# Patient Record
Sex: FEMALE | Race: WHITE | HISPANIC OR LATINO | URBAN - METROPOLITAN AREA
[De-identification: names, ages, dates, MRNs, and addresses within clinical notes are randomized per-mention and may not be internally consistent; named-entity substitution may affect disease eponyms.]

---

## 2019-06-20 ENCOUNTER — INPATIENT (INPATIENT)
Facility: HOSPITAL | Age: 49
LOS: 6 days | Discharge: HOME | DRG: 919 | End: 2019-06-27
Attending: INTERNAL MEDICINE | Admitting: INTERNAL MEDICINE
Payer: MEDICAID

## 2019-06-20 VITALS
TEMPERATURE: 98 F | SYSTOLIC BLOOD PRESSURE: 124 MMHG | HEART RATE: 85 BPM | OXYGEN SATURATION: 100 % | WEIGHT: 149.91 LBS | RESPIRATION RATE: 16 BRPM | DIASTOLIC BLOOD PRESSURE: 73 MMHG

## 2019-06-20 LAB
ALBUMIN SERPL ELPH-MCNC: 4.3 G/DL — SIGNIFICANT CHANGE UP (ref 3.5–5)
ALP SERPL-CCNC: 212 U/L — HIGH (ref 40–120)
ALT FLD-CCNC: 735 U/L DA — HIGH (ref 10–60)
ANION GAP SERPL CALC-SCNC: 5 MMOL/L — SIGNIFICANT CHANGE UP (ref 5–17)
APPEARANCE UR: CLEAR — SIGNIFICANT CHANGE UP
AST SERPL-CCNC: 888 U/L — HIGH (ref 10–40)
BASOPHILS # BLD AUTO: 0.04 K/UL — SIGNIFICANT CHANGE UP (ref 0–0.2)
BASOPHILS NFR BLD AUTO: 0.3 % — SIGNIFICANT CHANGE UP (ref 0–2)
BILIRUB SERPL-MCNC: 1.8 MG/DL — HIGH (ref 0.2–1.2)
BILIRUB UR-MCNC: ABNORMAL
BUN SERPL-MCNC: 13 MG/DL — SIGNIFICANT CHANGE UP (ref 7–18)
CALCIUM SERPL-MCNC: 11.6 MG/DL — HIGH (ref 8.4–10.5)
CHLORIDE SERPL-SCNC: 107 MMOL/L — SIGNIFICANT CHANGE UP (ref 96–108)
CO2 SERPL-SCNC: 29 MMOL/L — SIGNIFICANT CHANGE UP (ref 22–31)
COLOR SPEC: YELLOW — SIGNIFICANT CHANGE UP
CREAT SERPL-MCNC: 1 MG/DL — SIGNIFICANT CHANGE UP (ref 0.5–1.3)
DIFF PNL FLD: NEGATIVE — SIGNIFICANT CHANGE UP
EOSINOPHIL # BLD AUTO: 0.01 K/UL — SIGNIFICANT CHANGE UP (ref 0–0.5)
EOSINOPHIL NFR BLD AUTO: 0.1 % — SIGNIFICANT CHANGE UP (ref 0–6)
GLUCOSE SERPL-MCNC: 145 MG/DL — HIGH (ref 70–99)
GLUCOSE UR QL: NEGATIVE — SIGNIFICANT CHANGE UP
HCG SERPL-ACNC: 2 MIU/ML — SIGNIFICANT CHANGE UP
HCG UR QL: NEGATIVE — SIGNIFICANT CHANGE UP
HCT VFR BLD CALC: 47.1 % — HIGH (ref 34.5–45)
HGB BLD-MCNC: 15.3 G/DL — SIGNIFICANT CHANGE UP (ref 11.5–15.5)
IMM GRANULOCYTES NFR BLD AUTO: 0.4 % — SIGNIFICANT CHANGE UP (ref 0–1.5)
KETONES UR-MCNC: NEGATIVE — SIGNIFICANT CHANGE UP
LEUKOCYTE ESTERASE UR-ACNC: NEGATIVE — SIGNIFICANT CHANGE UP
LIDOCAIN IGE QN: 300 U/L — SIGNIFICANT CHANGE UP (ref 73–393)
LYMPHOCYTES # BLD AUTO: 0.7 K/UL — LOW (ref 1–3.3)
LYMPHOCYTES # BLD AUTO: 5 % — LOW (ref 13–44)
MCHC RBC-ENTMCNC: 28.8 PG — SIGNIFICANT CHANGE UP (ref 27–34)
MCHC RBC-ENTMCNC: 32.5 GM/DL — SIGNIFICANT CHANGE UP (ref 32–36)
MCV RBC AUTO: 88.5 FL — SIGNIFICANT CHANGE UP (ref 80–100)
MONOCYTES # BLD AUTO: 0.84 K/UL — SIGNIFICANT CHANGE UP (ref 0–0.9)
MONOCYTES NFR BLD AUTO: 6 % — SIGNIFICANT CHANGE UP (ref 2–14)
NEUTROPHILS # BLD AUTO: 12.42 K/UL — HIGH (ref 1.8–7.4)
NEUTROPHILS NFR BLD AUTO: 88.2 % — HIGH (ref 43–77)
NITRITE UR-MCNC: NEGATIVE — SIGNIFICANT CHANGE UP
NRBC # BLD: 0 /100 WBCS — SIGNIFICANT CHANGE UP (ref 0–0)
PH UR: 7 — SIGNIFICANT CHANGE UP (ref 5–8)
PLATELET # BLD AUTO: 240 K/UL — SIGNIFICANT CHANGE UP (ref 150–400)
POTASSIUM SERPL-MCNC: 4 MMOL/L — SIGNIFICANT CHANGE UP (ref 3.5–5.3)
POTASSIUM SERPL-SCNC: 4 MMOL/L — SIGNIFICANT CHANGE UP (ref 3.5–5.3)
PROT SERPL-MCNC: 8.4 G/DL — HIGH (ref 6–8.3)
PROT UR-MCNC: NEGATIVE — SIGNIFICANT CHANGE UP
RBC # BLD: 5.32 M/UL — HIGH (ref 3.8–5.2)
RBC # FLD: 11.9 % — SIGNIFICANT CHANGE UP (ref 10.3–14.5)
SODIUM SERPL-SCNC: 141 MMOL/L — SIGNIFICANT CHANGE UP (ref 135–145)
SP GR SPEC: 1.01 — SIGNIFICANT CHANGE UP (ref 1.01–1.02)
UROBILINOGEN FLD QL: 8
WBC # BLD: 14.07 K/UL — HIGH (ref 3.8–10.5)
WBC # FLD AUTO: 14.07 K/UL — HIGH (ref 3.8–10.5)

## 2019-06-20 PROCEDURE — 76705 ECHO EXAM OF ABDOMEN: CPT | Mod: 26

## 2019-06-20 PROCEDURE — 74177 CT ABD & PELVIS W/CONTRAST: CPT | Mod: 26

## 2019-06-20 RX ORDER — SODIUM CHLORIDE 9 MG/ML
1000 INJECTION INTRAMUSCULAR; INTRAVENOUS; SUBCUTANEOUS ONCE
Refills: 0 | Status: COMPLETED | OUTPATIENT
Start: 2019-06-20 | End: 2019-06-20

## 2019-06-20 RX ORDER — KETOROLAC TROMETHAMINE 30 MG/ML
15 SYRINGE (ML) INJECTION ONCE
Refills: 0 | Status: DISCONTINUED | OUTPATIENT
Start: 2019-06-20 | End: 2019-06-20

## 2019-06-20 RX ORDER — MORPHINE SULFATE 50 MG/1
4 CAPSULE, EXTENDED RELEASE ORAL ONCE
Refills: 0 | Status: DISCONTINUED | OUTPATIENT
Start: 2019-06-20 | End: 2019-06-20

## 2019-06-20 RX ORDER — ONDANSETRON 8 MG/1
4 TABLET, FILM COATED ORAL ONCE
Refills: 0 | Status: COMPLETED | OUTPATIENT
Start: 2019-06-20 | End: 2019-06-20

## 2019-06-20 RX ADMIN — ONDANSETRON 4 MILLIGRAM(S): 8 TABLET, FILM COATED ORAL at 21:41

## 2019-06-20 RX ADMIN — MORPHINE SULFATE 4 MILLIGRAM(S): 50 CAPSULE, EXTENDED RELEASE ORAL at 21:41

## 2019-06-20 RX ADMIN — SODIUM CHLORIDE 1000 MILLILITER(S): 9 INJECTION INTRAMUSCULAR; INTRAVENOUS; SUBCUTANEOUS at 20:31

## 2019-06-20 NOTE — ED PROVIDER NOTE - PROGRESS NOTE DETAILS
Pt with US abd and CT abd/pelvis showing no acute surgical pathology.  However with elevations in LFT's, alk phos, total bilirubin, will admit.  Dr. Macias accepts unattached admission. BURT hinds.

## 2019-06-20 NOTE — ED ADULT NURSE NOTE - NSIMPLEMENTINTERV_GEN_ALL_ED
Implemented All Universal Safety Interventions:  [place] to call system. Call bell, personal items and telephone within reach. Instruct patient to call for assistance. Room bathroom lighting operational. Non-slip footwear when patient is off stretcher. Physically safe environment: no spills, clutter or unnecessary equipment. Stretcher in lowest position, wheels locked, appropriate side rails in place.

## 2019-06-20 NOTE — ED PROVIDER NOTE - CLINICAL SUMMARY MEDICAL DECISION MAKING FREE TEXT BOX
48 y/o F with history of cholecystectomy presents with abdominal pain and vomiting. Will obtain labs, UA, CT abd/pelvis, ultrasound abdomen and reassess.

## 2019-06-20 NOTE — ED PROVIDER NOTE - CARE PLAN
Principal Discharge DX:	Epigastric pain  Secondary Diagnosis:	Elevated LFTs  Secondary Diagnosis:	Hyperbilirubinemia

## 2019-06-20 NOTE — ED PROVIDER NOTE - ABDOMINAL TENDER
severe upper abdominal tenderness, mild lower abdominal tenderness bilaterally/left lower quadrant/left upper quadrant/right upper quadrant/right lower quadrant

## 2019-06-20 NOTE — ED PROVIDER NOTE - OBJECTIVE STATEMENT
50 y/o F with a significant PMHx of pancreatitis, hepatitis since August 2018 and a significant PSHx of cholecystectomy presents to the ED with complaints of epigastric pain with radiation to mid back  and vomiting x 1 day. Patient reports last bowel movement was approximately 4 days ago. Patient does report scant amount of blood in vomitus. Denies fever, chills, dysuria, hematuria or any other acute complaints.

## 2019-06-21 DIAGNOSIS — R74.0 NONSPECIFIC ELEVATION OF LEVELS OF TRANSAMINASE AND LACTIC ACID DEHYDROGENASE [LDH]: ICD-10-CM

## 2019-06-21 DIAGNOSIS — E83.52 HYPERCALCEMIA: ICD-10-CM

## 2019-06-21 DIAGNOSIS — Z98.890 OTHER SPECIFIED POSTPROCEDURAL STATES: Chronic | ICD-10-CM

## 2019-06-21 DIAGNOSIS — Z98.82 BREAST IMPLANT STATUS: Chronic | ICD-10-CM

## 2019-06-21 DIAGNOSIS — Z29.9 ENCOUNTER FOR PROPHYLACTIC MEASURES, UNSPECIFIED: ICD-10-CM

## 2019-06-21 DIAGNOSIS — R10.9 UNSPECIFIED ABDOMINAL PAIN: ICD-10-CM

## 2019-06-21 DIAGNOSIS — Z90.49 ACQUIRED ABSENCE OF OTHER SPECIFIED PARTS OF DIGESTIVE TRACT: Chronic | ICD-10-CM

## 2019-06-21 DIAGNOSIS — R10.13 EPIGASTRIC PAIN: ICD-10-CM

## 2019-06-21 LAB
24R-OH-CALCIDIOL SERPL-MCNC: 15.4 NG/ML — LOW (ref 30–80)
ALBUMIN SERPL ELPH-MCNC: 3.6 G/DL — SIGNIFICANT CHANGE UP (ref 3.5–5)
ALP SERPL-CCNC: 184 U/L — HIGH (ref 40–120)
ALT FLD-CCNC: 679 U/L DA — HIGH (ref 10–60)
ANION GAP SERPL CALC-SCNC: 7 MMOL/L — SIGNIFICANT CHANGE UP (ref 5–17)
APAP SERPL-MCNC: <10 UG/ML — SIGNIFICANT CHANGE UP (ref 10–30)
AST SERPL-CCNC: 452 U/L — HIGH (ref 10–40)
BASOPHILS # BLD AUTO: 0.03 K/UL — SIGNIFICANT CHANGE UP (ref 0–0.2)
BASOPHILS NFR BLD AUTO: 0.2 % — SIGNIFICANT CHANGE UP (ref 0–2)
BILIRUB DIRECT SERPL-MCNC: 1.9 MG/DL — HIGH (ref 0–0.2)
BILIRUB SERPL-MCNC: 3.1 MG/DL — HIGH (ref 0.2–1.2)
BUN SERPL-MCNC: 8 MG/DL — SIGNIFICANT CHANGE UP (ref 7–18)
CA-I BLD-SCNC: 1.51 MMOL/L — HIGH (ref 1.12–1.3)
CALCIUM SERPL-MCNC: 10.3 MG/DL — SIGNIFICANT CHANGE UP (ref 8.4–10.5)
CALCIUM SERPL-MCNC: 10.8 MG/DL — HIGH (ref 8.4–10.5)
CHLORIDE SERPL-SCNC: 110 MMOL/L — HIGH (ref 96–108)
CHOLEST SERPL-MCNC: 217 MG/DL — HIGH (ref 10–199)
CK MB BLD-MCNC: <1.8 % — SIGNIFICANT CHANGE UP (ref 0–3.5)
CK MB CFR SERPL CALC: <1 NG/ML — SIGNIFICANT CHANGE UP (ref 0–3.6)
CK SERPL-CCNC: 57 U/L — SIGNIFICANT CHANGE UP (ref 21–215)
CO2 SERPL-SCNC: 23 MMOL/L — SIGNIFICANT CHANGE UP (ref 22–31)
CREAT SERPL-MCNC: 0.75 MG/DL — SIGNIFICANT CHANGE UP (ref 0.5–1.3)
EOSINOPHIL # BLD AUTO: 0.01 K/UL — SIGNIFICANT CHANGE UP (ref 0–0.5)
EOSINOPHIL NFR BLD AUTO: 0.1 % — SIGNIFICANT CHANGE UP (ref 0–6)
FOLATE SERPL-MCNC: 8.7 NG/ML — SIGNIFICANT CHANGE UP
GGT SERPL-CCNC: 421 U/L — HIGH (ref 8–40)
GLUCOSE SERPL-MCNC: 105 MG/DL — HIGH (ref 70–99)
HAV IGM SER-ACNC: SIGNIFICANT CHANGE UP
HBA1C BLD-MCNC: 5.4 % — SIGNIFICANT CHANGE UP (ref 4–5.6)
HBV CORE IGM SER-ACNC: SIGNIFICANT CHANGE UP
HBV SURFACE AG SER-ACNC: SIGNIFICANT CHANGE UP
HCT VFR BLD CALC: 43.7 % — SIGNIFICANT CHANGE UP (ref 34.5–45)
HCV AB S/CO SERPL IA: 0.1 S/CO — SIGNIFICANT CHANGE UP (ref 0–0.99)
HCV AB SERPL-IMP: SIGNIFICANT CHANGE UP
HDLC SERPL-MCNC: 52 MG/DL — SIGNIFICANT CHANGE UP
HGB BLD-MCNC: 14.1 G/DL — SIGNIFICANT CHANGE UP (ref 11.5–15.5)
IMM GRANULOCYTES NFR BLD AUTO: 0.6 % — SIGNIFICANT CHANGE UP (ref 0–1.5)
LACTATE SERPL-SCNC: 1 MMOL/L — SIGNIFICANT CHANGE UP (ref 0.7–2)
LIPID PNL WITH DIRECT LDL SERPL: 152 MG/DL — SIGNIFICANT CHANGE UP
LYMPHOCYTES # BLD AUTO: 0.81 K/UL — LOW (ref 1–3.3)
LYMPHOCYTES # BLD AUTO: 5.8 % — LOW (ref 13–44)
MAGNESIUM SERPL-MCNC: 1.9 MG/DL — SIGNIFICANT CHANGE UP (ref 1.6–2.6)
MCHC RBC-ENTMCNC: 28.9 PG — SIGNIFICANT CHANGE UP (ref 27–34)
MCHC RBC-ENTMCNC: 32.3 GM/DL — SIGNIFICANT CHANGE UP (ref 32–36)
MCV RBC AUTO: 89.5 FL — SIGNIFICANT CHANGE UP (ref 80–100)
MONOCYTES # BLD AUTO: 0.87 K/UL — SIGNIFICANT CHANGE UP (ref 0–0.9)
MONOCYTES NFR BLD AUTO: 6.2 % — SIGNIFICANT CHANGE UP (ref 2–14)
NEUTROPHILS # BLD AUTO: 12.15 K/UL — HIGH (ref 1.8–7.4)
NEUTROPHILS NFR BLD AUTO: 87.1 % — HIGH (ref 43–77)
NRBC # BLD: 0 /100 WBCS — SIGNIFICANT CHANGE UP (ref 0–0)
PHOSPHATE SERPL-MCNC: 1.8 MG/DL — LOW (ref 2.5–4.5)
PLATELET # BLD AUTO: 167 K/UL — SIGNIFICANT CHANGE UP (ref 150–400)
POTASSIUM SERPL-MCNC: 3.7 MMOL/L — SIGNIFICANT CHANGE UP (ref 3.5–5.3)
POTASSIUM SERPL-SCNC: 3.7 MMOL/L — SIGNIFICANT CHANGE UP (ref 3.5–5.3)
PROT SERPL-MCNC: 7.1 G/DL — SIGNIFICANT CHANGE UP (ref 6–8.3)
PTH-INTACT FLD-MCNC: 241 PG/ML — HIGH (ref 15–65)
RBC # BLD: 4.88 M/UL — SIGNIFICANT CHANGE UP (ref 3.8–5.2)
RBC # FLD: 11.9 % — SIGNIFICANT CHANGE UP (ref 10.3–14.5)
SODIUM SERPL-SCNC: 140 MMOL/L — SIGNIFICANT CHANGE UP (ref 135–145)
TOTAL CHOLESTEROL/HDL RATIO MEASUREMENT: 4.2 RATIO — SIGNIFICANT CHANGE UP (ref 3.3–7.1)
TRIGL SERPL-MCNC: 66 MG/DL — SIGNIFICANT CHANGE UP (ref 10–149)
TROPONIN I SERPL-MCNC: <0.015 NG/ML — SIGNIFICANT CHANGE UP (ref 0–0.04)
TSH SERPL-MCNC: 0.3 UU/ML — LOW (ref 0.34–4.82)
VIT B12 SERPL-MCNC: 1004 PG/ML — SIGNIFICANT CHANGE UP (ref 232–1245)
VIT D25+D1,25 OH+D1,25 PNL SERPL-MCNC: 96.5 PG/ML — HIGH (ref 19.9–79.3)
WBC # BLD: 13.96 K/UL — HIGH (ref 3.8–10.5)
WBC # FLD AUTO: 13.96 K/UL — HIGH (ref 3.8–10.5)

## 2019-06-21 PROCEDURE — 99285 EMERGENCY DEPT VISIT HI MDM: CPT

## 2019-06-21 PROCEDURE — 99223 1ST HOSP IP/OBS HIGH 75: CPT

## 2019-06-21 RX ORDER — KETOROLAC TROMETHAMINE 30 MG/ML
15 SYRINGE (ML) INJECTION EVERY 8 HOURS
Refills: 0 | Status: DISCONTINUED | OUTPATIENT
Start: 2019-06-21 | End: 2019-06-21

## 2019-06-21 RX ORDER — SODIUM CHLORIDE 9 MG/ML
1000 INJECTION INTRAMUSCULAR; INTRAVENOUS; SUBCUTANEOUS ONCE
Refills: 0 | Status: COMPLETED | OUTPATIENT
Start: 2019-06-21 | End: 2019-06-21

## 2019-06-21 RX ORDER — SODIUM CHLORIDE 9 MG/ML
1000 INJECTION INTRAMUSCULAR; INTRAVENOUS; SUBCUTANEOUS
Refills: 0 | Status: DISCONTINUED | OUTPATIENT
Start: 2019-06-21 | End: 2019-06-26

## 2019-06-21 RX ORDER — POTASSIUM PHOSPHATE, MONOBASIC POTASSIUM PHOSPHATE, DIBASIC 236; 224 MG/ML; MG/ML
15 INJECTION, SOLUTION INTRAVENOUS ONCE
Refills: 0 | Status: COMPLETED | OUTPATIENT
Start: 2019-06-21 | End: 2019-06-21

## 2019-06-21 RX ORDER — ONDANSETRON 8 MG/1
4 TABLET, FILM COATED ORAL EVERY 8 HOURS
Refills: 0 | Status: DISCONTINUED | OUTPATIENT
Start: 2019-06-21 | End: 2019-06-27

## 2019-06-21 RX ORDER — PIPERACILLIN AND TAZOBACTAM 4; .5 G/20ML; G/20ML
3.38 INJECTION, POWDER, LYOPHILIZED, FOR SOLUTION INTRAVENOUS EVERY 12 HOURS
Refills: 0 | Status: DISCONTINUED | OUTPATIENT
Start: 2019-06-21 | End: 2019-06-27

## 2019-06-21 RX ORDER — TRAMADOL HYDROCHLORIDE 50 MG/1
25 TABLET ORAL EVERY 8 HOURS
Refills: 0 | Status: DISCONTINUED | OUTPATIENT
Start: 2019-06-21 | End: 2019-06-25

## 2019-06-21 RX ORDER — PIPERACILLIN AND TAZOBACTAM 4; .5 G/20ML; G/20ML
3.38 INJECTION, POWDER, LYOPHILIZED, FOR SOLUTION INTRAVENOUS ONCE
Refills: 0 | Status: COMPLETED | OUTPATIENT
Start: 2019-06-21 | End: 2019-06-21

## 2019-06-21 RX ADMIN — PIPERACILLIN AND TAZOBACTAM 25 GRAM(S): 4; .5 INJECTION, POWDER, LYOPHILIZED, FOR SOLUTION INTRAVENOUS at 17:12

## 2019-06-21 RX ADMIN — MORPHINE SULFATE 4 MILLIGRAM(S): 50 CAPSULE, EXTENDED RELEASE ORAL at 00:46

## 2019-06-21 RX ADMIN — SODIUM CHLORIDE 2000 MILLILITER(S): 9 INJECTION INTRAMUSCULAR; INTRAVENOUS; SUBCUTANEOUS at 03:35

## 2019-06-21 RX ADMIN — POTASSIUM PHOSPHATE, MONOBASIC POTASSIUM PHOSPHATE, DIBASIC 62.5 MILLIMOLE(S): 236; 224 INJECTION, SOLUTION INTRAVENOUS at 11:59

## 2019-06-21 RX ADMIN — SODIUM CHLORIDE 1000 MILLILITER(S): 9 INJECTION INTRAMUSCULAR; INTRAVENOUS; SUBCUTANEOUS at 00:50

## 2019-06-21 RX ADMIN — PIPERACILLIN AND TAZOBACTAM 200 GRAM(S): 4; .5 INJECTION, POWDER, LYOPHILIZED, FOR SOLUTION INTRAVENOUS at 11:59

## 2019-06-21 RX ADMIN — Medication 30 MILLILITER(S): at 00:49

## 2019-06-21 RX ADMIN — Medication 15 MILLIGRAM(S): at 12:48

## 2019-06-21 RX ADMIN — SODIUM CHLORIDE 1000 MILLILITER(S): 9 INJECTION INTRAMUSCULAR; INTRAVENOUS; SUBCUTANEOUS at 00:54

## 2019-06-21 RX ADMIN — Medication 15 MILLIGRAM(S): at 00:53

## 2019-06-21 RX ADMIN — SODIUM CHLORIDE 1000 MILLILITER(S): 9 INJECTION INTRAMUSCULAR; INTRAVENOUS; SUBCUTANEOUS at 00:53

## 2019-06-21 RX ADMIN — Medication 15 MILLIGRAM(S): at 00:49

## 2019-06-21 RX ADMIN — Medication 15 MILLIGRAM(S): at 13:10

## 2019-06-21 RX ADMIN — SODIUM CHLORIDE 100 MILLILITER(S): 9 INJECTION INTRAMUSCULAR; INTRAVENOUS; SUBCUTANEOUS at 02:36

## 2019-06-21 NOTE — H&P ADULT - NSICDXPASTMEDICALHX_GEN_ALL_CORE_FT
PAST MEDICAL HISTORY:  H/O cholecystitis     H/O hypercalcemia     Hepatitis     History of hyperparathyroidism     Pancreatitis

## 2019-06-21 NOTE — PROGRESS NOTE ADULT - SUBJECTIVE AND OBJECTIVE BOX
NP Note    49 yr old female with PMH of Pancreatitis, Hyperparathyroidism Gallstones s/p Cholecystectomy with stent placement ( 2018), Internal hemorrhoids, Breast implants, Abdominoplasty, chronic constipation, overactive bladder ( s/p bladder surgery) came with complain of abdominal pain  radiating to back and right shoulder. Pt found to have elevated LFTs and bilirubin. CT abdomen and pelvis shows  hepatic steatosis, Biliary stent in place with no acute findings   Pt was admitted with abdominal pain likely due to  retained biliary stent. Started on Zosyn. GI consulted Dr Carias. Pt for ERCP with stent removal  on Monday. States feling better today, on clear liquid diet. No vomiting. No fever     INTERVAL HPI/OVERNIGHT EVENTS: no new complaints    MEDICATIONS  (STANDING):  piperacillin/tazobactam IVPB.. 3.375 Gram(s) IV Intermittent every 12 hours  sodium chloride 0.9%. 1000 milliLiter(s) (100 mL/Hr) IV Continuous <Continuous>    MEDICATIONS  (PRN):  ketorolac   Injectable 15 milliGRAM(s) IV Push every 8 hours PRN Severe Pain (7 - 10)  ondansetron Injectable 4 milliGRAM(s) IV Push every 8 hours PRN Nausea and/or Vomiting  traMADol 25 milliGRAM(s) Oral every 8 hours PRN Moderate Pain (4 - 6)      __________________________________________________  REVIEW OF SYSTEMS:    CONSTITUTIONAL: No fever,   RESPIRATORY: No cough; No shortness of breath  CARDIOVASCULAR: No chest pain, no palpitations  GASTROINTESTINAL: epigastric pain and nausea    NEUROLOGICAL: No headache or numbness, no tremors  MUSCULOSKELETAL: No joint pain, no muscle pain  GENITOURINARY: no dysuria, no frequency, no hesitancy  PSYCHIATRY: no depression , no anxiety  ALL OTHER  ROS negative        Vital Signs Last 24 Hrs  T(C): 38.2 (2019 08:16), Max: 38.2 (2019 08:16)  T(F): 100.8 (2019 08:16), Max: 100.8 (2019 08:16)  HR: 106 (2019 05:05) (85 - 119)  BP: 128/74 (2019 05:05) (85/56 - 128/74)  BP(mean): --  RR: 17 (2019 05:05) (16 - 17)  SpO2: 96% (2019 05:05) (96% - 100%)    ________________________________________________  PHYSICAL EXAM:  GENERAL: NAD  CHEST/LUNG: Clear to auscultation bilaterally with good air entry   HEART: S1 S2  regular; no murmurs, gallops or rubs  ABDOMEN: Soft, + mild ttp at epigastic area and RUQ, Nondistended; Bowel sounds present  EXTREMITIES: no cyanosis; no edema; no calf tenderness  SKIN: warm and dry; no rash  NERVOUS SYSTEM:  Awake and alert; Oriented  to place, person and time ; no new deficits    _________________________________________________  LABS:                        14.1   13.96 )-----------( 167      ( 2019 06:10 )             43.7     06-    140  |  110<H>  |  8   ----------------------------<  105<H>  3.7   |  23  |  0.75    Ca    10.3      2019 06:10  Phos  1.8     -  Mg     1.9         TPro  7.1  /  Alb  3.6  /  TBili  3.1<H>  /  DBili  1.9<H>  /  AST  452<H>  /  ALT  679<H>  /  AlkPhos  184<H>        Urinalysis Basic - ( 2019 20:39 )    Color: Yellow / Appearance: Clear / S.010 / pH: x  Gluc: x / Ketone: Negative  / Bili: Small / Urobili: 8   Blood: x / Protein: Negative / Nitrite: Negative   Leuk Esterase: Negative / RBC: x / WBC x   Sq Epi: x / Non Sq Epi: x / Bacteria: x      CAPILLARY BLOOD GLUCOSE            RADIOLOGY & ADDITIONAL TESTS:    Imaging Personally Reviewed:  YES/NO    Consultant(s) Notes Reviewed:   YES/ No    Care Discussed with Consultants :     Plan of care was discussed with patient and /or primary care giver; all questions and concerns were addressed and care was aligned with patient's wishes.

## 2019-06-21 NOTE — H&P ADULT - HISTORY OF PRESENT ILLNESS
49 yr old F from home, ambulates independently, , originally from [place], with PMH of Pancreatitis, Hyperparathyroidism? ( Diagnosed in 2017, was prescribed Alendronate and Furosemide but refused medications and Surgery), Gallstones s/p Cholecystectomy ( August 2018), Internal hemorrhoids, chronic constipation came with complain of abdominal pain x 1 day.     Patient is Belgian speaking used Interpreters ID: [id number], [id number] states she woke up in morning with sudden severe 10/10 constant epigastric abdominal pain radiating to back on Right side, and Right shoulder pain, which progressively got worse alma after eating fish. She reports of having NBNB vomiting x3. She went to her gastroenterologist Dr. Echavarria who referred her in ED.   Patient notes of having similar abdominal pain in Aug 2018 when she had Cholecystectomy in [facility], she was unaware that stent was placed at that time. In Feb 2019, she developed severe pain again so she went to gastroenterologist who told her that she has Biliary stent which needs to removed. She was unsure and wanted to get records from [facility] before stent removal. Gastroenterologist also did Colonoscopy and Endoscopy in Feb 2019, then told her that she has parasite? ( probably H pylori)  in stomach, needs treatment with medications for 4 weeks. She does not remember name of medications, she took only one week but because of indigestion, she stopped taking medications. 49 yr old F from home, ambulates independently, , originally from [place], with PMH of Pancreatitis, Hyperparathyroidism? ( Diagnosed in 2017, was prescribed Alendronate and Furosemide but refused medications and Surgery.  Serum Calcium in 2017: 11.8), Gallstones s/p Cholecystectomy ( August 2018), Internal hemorrhoids, Breast implants, Abdominoplasty, chronic constipation came with complain of abdominal pain x 1 day.     Patient is Icelandic speaking used Interpreters ID: [id number], [id number] states she woke up in morning with sudden severe 10/10 constant epigastric abdominal pain radiating to back on Right side, and Right shoulder pain, which progressively got worse alma after eating fish. She reports of having NBNB vomiting x3, numbness and tingling in her Left arm, chills.  She went to her gastroenterologist Dr. Echavarria who referred her in ED.     Patient notes of having similar abdominal pain in Aug 2018 when she had Cholecystectomy in [facility], she was unaware that stent was placed at that time. In Feb 2019, she developed severe pain again. Her gastroenterologist ( Dr. Echavarria)  told her at that time that her ultrasound abdomen shows she has a Biliary stent which needs to removed. She was unsure and wanted to get records from [facility] before stent removal. Colonoscopy and Endoscopy in Feb 2019 was done. Her gastroenterologist  told her that she has parasite? ( Probably H pylori)  in stomach, needs treatment with medications for 4 weeks. She does not remember name of medications, she took only one week but because of indigestion, she stopped taking medications. She denies fever, headache, diarrhea, dysuria, hx of renal stones or any other complains. Her last travel was in December 2019 to [place].    In ED, patient's vital signs were remarkable for HR:119, Lowest BP 80/90, EKG shows sinus tachycardia. Labs were remarkable for WBC:14.07, Serum Ca: 11.7, Total Protein 8.4, ALK PO: 212, AST:888, ALT: 735, Bilirubin 1.8, CT abdomen and pelvis shows Ventral hernia, hepatic steatosis, Biliary stent in place. Patient does not take any medications ( although she was prescribed Mirabegron, Lasix and Alendronate)    Goals of care: Full code 49 yr old F from home, ambulates independently, , originally from [place], with PMH of Pancreatitis, Hyperparathyroidism? ( Diagnosed in 2017, was prescribed Alendronate and Furosemide but refused medications and Surgery.  Serum Calcium in 2017: 11.8), Gallstones s/p Cholecystectomy ( August 2018), Internal hemorrhoids, Breast implants, Abdominoplasty, chronic constipation, overactive bladder ( was on Mirabegron, s/p bladder surgery) came with complain of abdominal pain x 1 day.     Patient is Malay speaking used Interpreters ID: [id number], [id number] states she woke up in morning with sudden severe 10/10 constant epigastric abdominal pain radiating to back on Right side, and Right shoulder pain, which progressively got worse alma after eating fish. She reports of having NBNB vomiting x3, numbness and tingling in her Left arm, chills.  She went to her gastroenterologist Dr. Echavarria who referred her in ED.     Patient notes of having similar abdominal pain in Aug 2018 when she had Cholecystectomy in [facility], she was unaware that stent was placed at that time. In Feb 2019, she developed severe pain again. Her gastroenterologist ( Dr. Echavarria)  told her at that time that her ultrasound abdomen shows she has a Biliary stent which needs to removed. She was unsure and wanted to get records from [facility] before stent removal. Colonoscopy and Endoscopy in Feb 2019 was done. Her gastroenterologist  told her that she has parasite? ( Probably H pylori)  in stomach, needs treatment with medications for 4 weeks. She does not remember name of medications, she took only one week but because of indigestion, she stopped taking medications. She denies fever, headache, diarrhea, dysuria, hx of renal stones or any other complains. Her last travel was in December 2019 to [place].    In ED, patient's vital signs were remarkable for HR:119, Lowest BP 80/90, EKG shows sinus tachycardia. Labs were remarkable for WBC:14.07, Serum Ca: 11.7, Total Protein 8.4, ALK PO: 212, AST:888, ALT: 735, Bilirubin 1.8, CT abdomen and pelvis shows Ventral hernia, hepatic steatosis, Biliary stent in place. Patient does not take any medications ( although she was prescribed Mirabegron, Lasix and Alendronate)    Goals of care: Full code    ***Called patient's pharmacy [facility] [phone] for confirmation of medications but no answer

## 2019-06-21 NOTE — H&P ADULT - PROBLEM SELECTOR PLAN 4
RISK                                                          Points  [  ] Previous VTE                                                3  [  ] Thrombophilia                                             2  [  ] Lower limb paralysis                                   2        (unable to hold up >15 seconds)    [  ] Current Cancer                                             2         (within 6 months)  [ x ] Immobilization > 24 hrs                              1  [  ] ICU/CCU stay > 24 hours                             1  [ x ] Age > 60                                                         1    IMPROVE VTE Score: 2  No VTE prophylaxis.

## 2019-06-21 NOTE — CONSULT NOTE ADULT - SUBJECTIVE AND OBJECTIVE BOX
Patient is a 49y old  Female who presents with a chief complaint of Abdominal pain (2019 12:25)      INTERVAL HPI/OVERNIGHT EVENTS:        PAST MEDICAL & SURGICAL HISTORY:  H/O cholecystitis  H/O hypercalcemia  History of hyperparathyroidism  Hepatitis  Pancreatitis  H/O abdominoplasty  H/O breast implant  History of biliary stent insertion  S/P cholecystectomy      REVIEW OF SYSTEMS: Total of twelve systems have been reviewed with patient and found to be negative unless mentioned in HPI      SOCIAL HISTORY  Alcohol: Does not drink  Tobacco: Does not smoke  Illicit substance use: None      FAMILY HISTORY: Non contributory to the present illness        No Known Allergies        T(C): 37.3 (19 @ 13:33), Max: 38.2 (19 @ 08:16)  HR: 89 (19 @ 13:33) (85 - 119)  BP: 104/67 (19 @ 13:33) (85/56 - 128/74)  RR: 16 (19 @ 13:33) (16 - 17)  SpO2: 96% (19 @ 13:33) (96% - 100%)        PHYSICAL EXAM:  GENERAL: Not in distress   CHEST/LUNG:  Aire ntry bilaterally  HEART: s1 and s2 present  ABDOMEN:  Nontender and  Nondistended  EXTREMITIES: No pedal  edema  CNS: Awake and Alert      LABS:                        14.1   13.96 )-----------( 167      ( 2019 06:10 )             43.7         140  |  110<H>  |  8   ----------------------------<  105<H>  3.7   |  23  |  0.75    Ca    10.3      2019 06:10  Phos  1.8       Mg     1.9         TPro  7.1  /  Alb  3.6  /  TBili  3.1<H>  /  DBili  1.9<H>  /  AST  452<H>  /  ALT  679<H>  /  AlkPhos  184<H>        Urinalysis Basic - ( 2019 20:39 )    Color: Yellow / Appearance: Clear / S.010 / pH: x  Gluc: x / Ketone: Negative  / Bili: Small / Urobili: 8   Blood: x / Protein: Negative / Nitrite: Negative   Leuk Esterase: Negative / RBC: x / WBC x   Sq Epi: x / Non Sq Epi: x / Bacteria: x      CAPILLARY BLOOD GLUCOSE            Urinalysis Basic - ( 2019 20:39 )    Color: Yellow / Appearance: Clear / S.010 / pH: x  Gluc: x / Ketone: Negative  / Bili: Small / Urobili: 8   Blood: x / Protein: Negative / Nitrite: Negative   Leuk Esterase: Negative / RBC: x / WBC x   Sq Epi: x / Non Sq Epi: x / Bacteria: x        MEDICATIONS  (STANDING):  piperacillin/tazobactam IVPB.. 3.375 Gram(s) IV Intermittent every 12 hours  sodium chloride 0.9%. 1000 milliLiter(s) (100 mL/Hr) IV Continuous <Continuous>    MEDICATIONS  (PRN):  ketorolac   Injectable 15 milliGRAM(s) IV Push every 8 hours PRN Severe Pain (7 - 10)  ondansetron Injectable 4 milliGRAM(s) IV Push every 8 hours PRN Nausea and/or Vomiting  traMADol 25 milliGRAM(s) Oral every 8 hours PRN Moderate Pain (4 - 6)          RADIOLOGY & ADDITIONAL TESTS: Patient is a 49y old  Female who presents with a chief complaint of Abdominal pain (2019 12:25)      REVIEW OF SYSTEMS: Total of twelve systems have been reviewed with patient and found to be negative unless mentioned in HPI        PAST MEDICAL & SURGICAL HISTORY:  H/O cholecystitis  H/O hypercalcemia  History of hyperparathyroidism  Hepatitis  Pancreatitis  H/O abdominoplasty  H/O breast implant  History of biliary stent insertion  S/P cholecystectomy        SOCIAL HISTORY  Alcohol: Does not drink  Tobacco: Does not smoke  Illicit substance use: None      FAMILY HISTORY: Non contributory to the present illness        ALLERGIES: No Known Allergies        T(C): 37.3 (19 @ 13:33), Max: 38.2 (19 @ 08:16)  HR: 89 (19 @ 13:33) (85 - 119)  BP: 104/67 (19 @ 13:33) (85/56 - 128/74)  RR: 16 (19 @ 13:33) (16 - 17)  SpO2: 96% (19 @ 13:33) (96% - 100%)        PHYSICAL EXAM:  GENERAL: Not in distress   CHEST/LUNG:  Aire ntry bilaterally  HEART: s1 and s2 present  ABDOMEN:  Nontender and  Nondistended  EXTREMITIES: No pedal  edema  CNS: Awake and Alert      LABS:                        14.1   13.96 )-----------( 167      ( 2019 06:10 )             43.7             140  |  110<H>  |  8   ----------------------------<  105<H>  3.7   |  23  |  0.75    Ca    10.3      2019 06:10  Phos  1.8       Mg     1.9         TPro  7.1  /  Alb  3.6  /  TBili  3.1<H>  /  DBili  1.9<H>  /  AST  452<H>  /  ALT  679<H>  /  AlkPhos  184<H>          Urinalysis Basic - ( 2019 20:39 )  Color: Yellow / Appearance: Clear / S.010 / pH: x  Gluc: x / Ketone: Negative  / Bili: Small / Urobili: 8   Blood: x / Protein: Negative / Nitrite: Negative   Leuk Esterase: Negative / RBC: x / WBC x   Sq Epi: x / Non Sq Epi: x / Bacteria: x          MEDICATIONS  (STANDING):  piperacillin/tazobactam IVPB.. 3.375 Gram(s) IV Intermittent every 12 hours  sodium chloride 0.9%. 1000 milliLiter(s) (100 mL/Hr) IV Continuous <Continuous>    MEDICATIONS  (PRN):  ketorolac   Injectable 15 milliGRAM(s) IV Push every 8 hours PRN Severe Pain (7 - 10)  ondansetron Injectable 4 milliGRAM(s) IV Push every 8 hours PRN Nausea and/or Vomiting  traMADol 25 milliGRAM(s) Oral every 8 hours PRN Moderate Pain (4 - 6)        RADIOLOGY & ADDITIONAL TESTS:    < from: CT Abdomen and Pelvis w/ IV Cont (19 @ 23:50) >  IMPRESSION: No acute intra-abdominal abnormality to explain the   presenting symptoms.    Incidental findings including hepatic steatosis, biliary stent in place,   breast implants, bilateral spondylolysis of L5.    < end of copied text > Patient is a 49y old  Female from home,  with h/o  Pancreatitis, Hyperparathyroidism? , Diagnosed in , was prescribed Alendronate and Furosemide but refused medications and Surgery.  Gallstones s/p Cholecystectomy ( 2018), Internal hemorrhoids, Breast implants, Abdominoplasty, chronic constipation, overactive bladder ( was on Mirabegron, s/p bladder surgery) presents to the ER for evaluation of  worsening epigastric pain, vomiting x3, numbness and tingling in her Left arm, chills.  She went to her gastroenterologist Dr. Echavarria who referred her in ER. On admission, she found to have  tachycardia, HR:119, Hypotensive,  BP 80/90, and Leukocytosis, WBC:14.07, Serum Ca: 11.7, and elevated LFts. The  CT abdomen and pelvis shows Ventral hernia, hepatic steatosis, Biliary stent in place.         REVIEW OF SYSTEMS: Total of twelve systems have been reviewed with patient and found to be negative unless mentioned in HPI        PAST MEDICAL & SURGICAL HISTORY:  H/O cholecystitis  H/O hypercalcemia  History of hyperparathyroidism  Hepatitis  Pancreatitis  H/O abdominoplasty  H/O breast implant  History of biliary stent insertion  S/P cholecystectomy        SOCIAL HISTORY  Alcohol: Does not drink  Tobacco: Does not smoke  Illicit substance use: None, She is a  , originally from [place],      FAMILY HISTORY: Non contributory to the present illness        ALLERGIES: No Known Allergies        T(C): 37.3 (19 @ 13:33), Max: 38.2 (19 @ 08:16)  HR: 89 (19 @ 13:33) (85 - 119)  BP: 104/67 (19 @ 13:33) (85/56 - 128/74)  RR: 16 (19 @ 13:33) (16 - 17)  SpO2: 96% (19 @ 13:33) (96% - 100%)        PHYSICAL EXAM:  GENERAL: Not in distress   CHEST/LUNG:  Air  entry bilaterally  HEART: s1 and s2 present  ABDOMEN:  Nontender and  Nondistended  EXTREMITIES: No pedal  edema  CNS: Awake and Alert        LABS:                        14.1   13.96 )-----------( 167      ( 2019 06:10 )             43.7         -    140  |  110<H>  |  8   ----------------------------<  105<H>  3.7   |  23  |  0.75    Ca    10.3      2019 06:10  Phos  1.8       Mg     1.9         TPro  7.1  /  Alb  3.6  /  TBili  3.1<H>  /  DBili  1.9<H>  /  AST  452<H>  /  ALT  679<H>  /  AlkPhos  184<H>          Urinalysis Basic - ( 2019 20:39 )  Color: Yellow / Appearance: Clear / S.010 / pH: x  Gluc: x / Ketone: Negative  / Bili: Small / Urobili: 8   Blood: x / Protein: Negative / Nitrite: Negative   Leuk Esterase: Negative / RBC: x / WBC x   Sq Epi: x / Non Sq Epi: x / Bacteria: x          MEDICATIONS  (STANDING):  piperacillin/tazobactam IVPB.. 3.375 Gram(s) IV Intermittent every 12 hours  sodium chloride 0.9%. 1000 milliLiter(s) (100 mL/Hr) IV Continuous <Continuous>    MEDICATIONS  (PRN):  ketorolac   Injectable 15 milliGRAM(s) IV Push every 8 hours PRN Severe Pain (7 - 10)  ondansetron Injectable 4 milliGRAM(s) IV Push every 8 hours PRN Nausea and/or Vomiting  traMADol 25 milliGRAM(s) Oral every 8 hours PRN Moderate Pain (4 - 6)        RADIOLOGY & ADDITIONAL TESTS:    19 : CT Abdomen and Pelvis w/ IV Cont (19 @ 23:50) : No acute intra-abdominal abnormality to explain the  presenting symptoms. Incidental findings including hepatic steatosis, biliary stent in place, breast implants, bilateral spondylolysis of L5.

## 2019-06-21 NOTE — CONSULT NOTE ADULT - ASSESSMENT
49 year old female with obstructive jaundice, retained biliary stent.     Plan:  Clear liquid diet   Anbiotic coverage   IM clearance for Anesthesia on Monday ( given hypercalcemia)   ERCP with stent removal and clearance of duct Monday in the OR at 1 pm (NPO post midnight Yassine night)

## 2019-06-21 NOTE — H&P ADULT - NSHPPHYSICALEXAM_GEN_ALL_CORE
Vital Signs Last 24 Hrs  T(C): 37.3 (21 Jun 2019 03:00), Max: 37.3 (21 Jun 2019 03:00)  T(F): 99.2 (21 Jun 2019 03:00), Max: 99.2 (21 Jun 2019 03:00)  HR: 105 (21 Jun 2019 03:00) (85 - 119)  BP: 85/56 (21 Jun 2019 03:00) (85/56 - 124/73)  BP(mean): --  RR: 17 (21 Jun 2019 03:00) (16 - 17)  SpO2: 97% (21 Jun 2019 03:00) (97% - 100%,

## 2019-06-21 NOTE — H&P ADULT - ATTENDING COMMENTS
She is a 49 year old Welsh speaking woman with hx of gallstone pancreatitis s/p cholecystectomy in [facility] last year presenting with epigastric pain associated with vomiting, and left arm numbness and pain and SOB. There are intermittent palpitations as well.  There is no fever, but did have chills, no diarrhea or other symptoms.  IN the ED, she is noted to have elevated liver enzymes but no significant findings on imaging.    She feels these symptoms are similar to what she had during her pancreatitis episode a year prior.  She incidentally has had many similar episodes in the past year- for which she had abdominal  imaging studies, upper and lower endoscopy with no abnormal findings.  She has no known medical history and no alcohol or tobacco abuse.     Vital Signs Last 24 Hrs  T(C): 37 (2019 23:55), Max: 37 (2019 23:55)  T(F): 98.6 (2019 23:55), Max: 98.6 (2019 23:55)  HR: 119 (2019 23:55) (85 - 119)  BP: 119/76 (2019 23:55) (119/76 - 124/73)  RR: 17 (2019 23:55) (16 - 17)  SpO2: 98% (2019 23:55) (98% - 100%)    Young woman, NAD AAO X 3  No pallor, no icterus  No oropharyngeal erythema  No JVD, no neck swelling  RRR s1s2 only  CTA B/L  No pedal edema  No focal deficits                          15.3   14.07 )-----------( 240      ( 2019 20:15 )             47.1     06-20    141  |  107  |  13  ----------------------------<  145<H>  4.0   |  29  |  1.00    Ca    11.6<H>      2019 20:15    TPro  8.4<H>  /  Alb  4.3  /  TBili  1.8<H>  /  DBili  x   /  AST  888<H>  /  ALT  735<H>  /  AlkPhos  212<H>  06-20    Urinalysis Basic - ( 2019 20:39 )    Color: Yellow / Appearance: Clear / S.010 / pH: x  Gluc: x / Ketone: Negative  / Bili: Small / Urobili: 8   Blood: x / Protein: Negative / Nitrite: Negative   Leuk Esterase: Negative / RBC: x / WBC x   Sq Epi: x / Non Sq Epi: x / Bacteria: x    US RUQ  The liver demonstrates fatty echotexture without focal lesion.  Hepatic   size and contours are maintained.  Hepatic and portal veins appear patent   and are not displaced.  No intrahepatic or ductal dilatation is found.    The common duct is not dilated, measuring .85cm.  The gallbladder is   surgically absent. The right kidney measures 10.1 cm in length.  It   demonstrates no mass, calculus or hydronephrosis.  The abdominal aorta       CT abdomen: No acute intra-abdominal abnormality to explain the   presenting symptoms.  Incidental findings including hepatic steatosis, biliary stent in place,   breast implants, bilateral spondylolysis of L5.  and IVC appear intact.    Impression  49 year old woman with hx as above presenting with recurrent epigastric pain with associated left arm pain/numbness, SOB and intermittent palpitations in the background of transaminases and mild bilirubinemia. It is unclear if this is related to the stent in the CBD ( despite cholecystectomy) but will consult GI physician.   There is a concern for an atypical ACS here as well and would r/o ACS    A/P  1) Epigastric pain   Transaminases and bilirubinemia  - r/o in-stent blockage of the CBD  - R/O ACS   - ?? acute viral hepatitis    2) Hypercalcemia  ? dehydration/constipation vs. endocrine changes    Plan  Serial trop  EKG  TSH/A1c/ vit D/PTH  IVF hydration +/- lasix  repeat chemistry in AM  GI consult   Call to primary GI physician - DR Echavarria ( [phone]) She is a 49 year old Telugu speaking woman with hx of gallstone pancreatitis s/p cholecystectomy in [facility] last year presenting with epigastric pain associated with vomiting, and left arm numbness and pain and SOB. There are intermittent palpitations as well.  There is no fever, but did have chills, no diarrhea or other symptoms.  IN the ED, she is noted to have elevated liver enzymes but no significant findings on imaging.    She feels these symptoms are similar to what she had during her pancreatitis episode a year prior.  She incidentally has had many similar episodes in the past year- for which she had abdominal  imaging studies, upper and lower endoscopy with no abnormal findings.  She has no known medical history and no alcohol or tobacco abuse.     Vital Signs Last 24 Hrs  T(C): 37 (2019 23:55), Max: 37 (2019 23:55)  T(F): 98.6 (2019 23:55), Max: 98.6 (2019 23:55)  HR: 119 (2019 23:55) (85 - 119)  BP: 119/76 (2019 23:55) (119/76 - 124/73)  RR: 17 (2019 23:55) (16 - 17)  SpO2: 98% (2019 23:55) (98% - 100%)    Young woman, NAD AAO X 3  No pallor, no icterus  No oropharyngeal erythema  No JVD, no neck swelling  RRR s1s2 only  CTA B/L  soft, TTP in the epigastric /umbilical/suprapubic regions  No pedal edema  No focal deficits                          15.3   14.07 )-----------( 240      ( 2019 20:15 )             47.1     06-20    141  |  107  |  13  ----------------------------<  145<H>  4.0   |  29  |  1.00    Ca    11.6<H>      2019 20:15    TPro  8.4<H>  /  Alb  4.3  /  TBili  1.8<H>  /  DBili  x   /  AST  888<H>  /  ALT  735<H>  /  AlkPhos  212<H>  06-20    Urinalysis Basic - ( 2019 20:39 )    Color: Yellow / Appearance: Clear / S.010 / pH: x  Gluc: x / Ketone: Negative  / Bili: Small / Urobili: 8   Blood: x / Protein: Negative / Nitrite: Negative   Leuk Esterase: Negative / RBC: x / WBC x   Sq Epi: x / Non Sq Epi: x / Bacteria: x    US RUQ  The liver demonstrates fatty echotexture without focal lesion.  Hepatic   size and contours are maintained.  Hepatic and portal veins appear patent   and are not displaced.  No intrahepatic or ductal dilatation is found.    The common duct is not dilated, measuring .85cm.  The gallbladder is   surgically absent. The right kidney measures 10.1 cm in length.  It   demonstrates no mass, calculus or hydronephrosis.  The abdominal aorta       CT abdomen: No acute intra-abdominal abnormality to explain the   presenting symptoms.  Incidental findings including hepatic steatosis, biliary stent in place,   breast implants, bilateral spondylolysis of L5.  and IVC appear intact.    Impression  49 year old woman with hx as above presenting with recurrent epigastric pain with associated left arm pain/numbness, SOB and intermittent palpitations in the background of transaminases and mild bilirubinemia. It is unclear if this is related to the stent in the CBD ( despite cholecystectomy) but will consult GI physician.   There is a concern for an atypical ACS here as well and would r/o ACS    A/P  1) Epigastric pain   Transaminases and bilirubinemia  - r/o in-stent blockage of the CBD  - R/O ACS   - ?? acute viral hepatitis    2) Hypercalcemia  Apparent hx of hyperparathyroidism but refused treatment; would obtain baseline work up  Counseling for compliance with treatment   ? dehydration/constipation vs. endocrine changes    Plan  Serial trop  EKG  TSH/A1c/ vit D/PTH  IVF hydration +/- lasix  repeat chemistry in AM  GI consult   Call to primary GI physician - DR Echavarria ( [phone])    NB- Patient now discloses she has PMH of hyperparathyroidism but refused treatment offered in the past.  She was also noted on EGD to have ? H-pylori but failed to complete treatment. She is a 49 year old Macanese speaking woman with hx of gallstone pancreatitis s/p cholecystectomy in [facility] last year presenting with epigastric pain associated with vomiting, and left arm numbness and pain and SOB. There are intermittent palpitations as well. There is no fever, but did have chills, no diarrhea or other symptoms.  IN the ED, she is noted to have elevated liver enzymes but no significant findings on imaging. She feels these symptoms are similar to what she had during her pancreatitis episode a year prior. She incidentally has had many similar episodes in the past year- for which she had abdominal  imaging studies, upper and lower endoscopy with no abnormal findings.  She has no known medical history and no alcohol or tobacco abuse.     Vital Signs Last 24 Hrs  T(C): 37 (2019 23:55), Max: 37 (2019 23:55)  T(F): 98.6 (2019 23:55), Max: 98.6 (2019 23:55)  HR: 119 (2019 23:55) (85 - 119)  BP: 119/76 (2019 23:55) (119/76 - 124/73)  RR: 17 (2019 23:55) (16 - 17)  SpO2: 98% (2019 23:55) (98% - 100%)    Young woman, NAD AAO X 3No pallor, no icterus. No oropharyngeal erythema  No JVD, no neck swelling  RRR s1s2 only CTA B/L  soft, TTP in the epigastric /umbilical/suprapubic regions  No pedal edemaNo focal deficits                        15.3   14.07 )-----------( 240      ( 2019 20:15 )             47.1             06-20  141  |  107  |  13  ----------------------------<  145<H>  4.0   |  29  |  1.00    Ca    11.6<H>      2019 20:15TPro  8.4<H>  /  Alb  4.3  /  TBili  1.8<H>  /  DBili  x   /  AST  888<H>  /  ALT  735<H>  /  AlkPhos  212<H>  06-20    Urinalysis Basic - ( 2019 20:39 )  Color: Yellow / Appearance: Clear / S.010 / pH: xGluc: x / Ketone: Negative  / Bili: Small / Urobili: 8   Blood: x / Protein: Negative / Nitrite: Negative Leuk Esterase: Negative / RBC: x / WBC x Sq Epi: x / Non Sq Epi: x / Bacteria: x    US RUQ  The liver demonstrates fatty echotexture without focal lesion.  Hepatic size and contours are maintained.  Hepatic and portal veins appear patent and are not displaced.  No intrahepatic or ductal dilatation is found.  The common duct is not dilated, measuring .85cm.  The gallbladder is surgically absent. The right kidney measures 10.1 cm in length.  It demonstrates no mass, calculus or hydronephrosis.  The abdominal aorta     CT abdomen: No acute intra-abdominal abnormality to explain the presenting symptoms. Incidental findings including hepatic steatosis, biliary stent in place, breast implants, bilateral spondylolysis of L5.and IVC appear intact.    Impression  49 year old woman with hx as above presenting with recurrent epigastric pain with associated left arm pain/numbness, SOB and intermittent palpitations in the background of transaminases and mild bilirubinemia. It is unclear if this is related to the stent in the CBD ( despite cholecystectomy) but will consult GI physician.   There is a concern for an atypical ACS here as well and would r/o ACS    A/P  1) Epigastric pain   Transaminitis and bilirubinemia  - r/o in-stent blockage of the CBD  - R/O ACS   - ?? acute viral hepatitis    2) Hypercalcemia  Apparent hx of hyperparathyroidism but refused treatment; would obtain baseline work up  Endocrinology consult  Counseling for compliance with treatment   ? dehydration/constipation vs. endocrine changes    Plan  Serial trop  EKG  TSH/A1c/ vit D/ PTH  IVF hydration +/- lasix  repeat chemistry in AM  GI consult   Call to primary GI physician - DR Echavarria ( [phone])    NB- Patient now discloses she has PMH of hyperparathyroidism but refused treatment offered in the past.  She was also noted on EGD to have ? H-pylori but failed to complete treatment.    NB- report of hypotension and low grade fever ( which with rectal temp confirmed at 100.8F) a few hours after admission and uptrending total and direct bilirubin is concerning for an acute cholangitis.  IVF resuscitation and broad spectrum IV antibiotics with zosyn after sepsis work up done  Updated GI consultant about changes.   Pt otherwise responded to IVF challenge and is hemodynamically stable with intact mental status exam  Will continue monitoring closely  JOMAR Edmond in charge of patient care updated and instruction for prompt GI call if patient's status deteriorates given.

## 2019-06-21 NOTE — H&P ADULT - PROBLEM SELECTOR PLAN 1
p/w epigastric abdominal pain, radiation to RUQ could be due to biliary stent/ H pylori induced peptic ulcer?   Will also rule out intrabdominal infection due to Biliary stent as patient was leukocytosis, hypotensive, tachycardiac but does not look toxic  F/u Blood cultures p/w epigastric abdominal pain, radiation to RUQ could be due to biliary stent/ H pylori induced peptic ulcer?   Will also rule out intrabdominal infection due to Biliary stent as patient was leukocytosis, hypotensive, tachycardiac but does not look toxic  F/u Blood cultures  c/w IV hydration, pain management  Avoid Tyelnol p/w epigastric abdominal pain, radiation to RUQ could be due to biliary stent/ H pylori induced peptic ulcer?   Will also rule out intrabdominal infection due to Biliary stent as patient was leukocytosis, hypotensive, tachycardiac but does not look toxic  F/u Blood cultures  c/w IV hydration, pain management  Avoid Tyelnol  ID consult Dr. Edmond  Gastro Dr. Loving

## 2019-06-21 NOTE — CONSULT NOTE ADULT - SUBJECTIVE AND OBJECTIVE BOX
Patient is a 49y old  Female who presents with a chief complaint of Abdominal pain (21 Jun 2019 10:24)    49 year  old Female PMH of Pancreatitis, Gallstones s/p Cholecystectomy presents with sudden severe 10/10 constant epigastric abdominal pain radiating to back.  Patient notes of having similar abdominal pain in Aug 2018 when she had Cholecystectomy in [facility], she was unaware that stent was placed at that time. In Feb 2019, she developed severe pain again. Her gastroenterologist ( Dr. Echavarria)  told her at that time that her ultrasound abdomen shows she has a Biliary stent which needs to removed.     REVIEW OF SYSTEMS  Constitutional:   No fever, no fatigue, no pallor, no night sweats, no weight loss.  HEENT:   No eye pain, no vision changes, no icterus, no mouth ulcers.  Respiratory:   No shortness of breath, no cough, no respiratory distress.   Cardiovascular:   No chest pain, no palpitations.   Gastrointestinal: + abdominal pain, no nausea, no vomiting , no diarrhea no constipation, no hematochezia, no melena.  Skin:   No rashes, no jaundice, no eczema.   Musculoskeletal:   No joint pain, no swelling, no myalgia.   Neurologic:   No headache, no seizure, no weakness.   Genitourinary:   No dysuria, no decreased urine output.  Psychiatric:  No depression, no anxiety,   Endocrine:   No thyroid disease, no diabetes.  Heme/Lymphatic:   No anemia, no blood transfusions, no lymph node enlargement, no bleeding, no bruising.  ___________________________________________________________________________________________  Allergies    No Known Allergies    Intolerances      MEDICATIONS  (STANDING):  piperacillin/tazobactam IVPB. 3.375 Gram(s) IV Intermittent once  piperacillin/tazobactam IVPB.. 3.375 Gram(s) IV Intermittent every 12 hours  potassium phosphate IVPB 15 milliMole(s) IV Intermittent once  sodium chloride 0.9%. 1000 milliLiter(s) (100 mL/Hr) IV Continuous <Continuous>    MEDICATIONS  (PRN):  ketorolac   Injectable 15 milliGRAM(s) IV Push every 8 hours PRN Severe Pain (7 - 10)  ondansetron Injectable 4 milliGRAM(s) IV Push every 8 hours PRN Nausea and/or Vomiting  traMADol 25 milliGRAM(s) Oral every 8 hours PRN Moderate Pain (4 - 6)      PAST MEDICAL & SURGICAL HISTORY:  H/O cholecystitis  H/O hypercalcemia  History of hyperparathyroidism  Hepatitis  Pancreatitis  H/O abdominoplasty  H/O breast implant  History of biliary stent insertion  S/P cholecystectomy    FAMILY HISTORY:  FHx: diabetes mellitus    Social History: No hsitory of : Tobacco use, IVDA, EToH  ______________________________________________________________________________________    PHYSICAL EXAM    Daily     Daily   BMI:   Change in Weight:  Vital Signs Last 24 Hrs  T(C): 38.2 (21 Jun 2019 08:16), Max: 38.2 (21 Jun 2019 08:16)  T(F): 100.8 (21 Jun 2019 08:16), Max: 100.8 (21 Jun 2019 08:16)  HR: 106 (21 Jun 2019 05:05) (85 - 119)  BP: 128/74 (21 Jun 2019 05:05) (85/56 - 128/74)  BP(mean): --  RR: 17 (21 Jun 2019 05:05) (16 - 17)  SpO2: 96% (21 Jun 2019 05:05) (96% - 100%)    General:  Well developed, well nourished, alert and active, no pallor, NAD.  HEENT:    Normal appearance of conjunctiva, ears, nose, lips, oropharynx, and oral mucosa, anicteric.  Neck:  No masses, no asymmetry.  Lymph Nodes:  No lymphadenopathy.   Cardiovascular:  RRR normal S1/S2, no murmur.  Respiratory:  CTA B/L, normal respiratory effort.   Abdominal:  + tenderness epigastrium   Extremities:   No clubbing or cyanosis, normal capillary refill, no edema.   Skin:   No rash, jaundice, lesions, eczema.   Musculoskeletal:  No joint swelling, erythema or tenderness.   Neuro: No focal deficits.   Other:   _______________________________________________________________________________________________  Lab Results:                          14.1   13.96 )-----------( 167      ( 21 Jun 2019 06:10 )             43.7     06-21    140  |  110<H>  |  8   ----------------------------<  105<H>  3.7   |  23  |  0.75    Ca    10.3      21 Jun 2019 06:10  Phos  1.8     06-21  Mg     1.9     06-21    TPro  7.1  /  Alb  3.6  /  TBili  3.1<H>  /  DBili  1.9<H>  /  AST  452<H>  /  ALT  679<H>  /  AlkPhos  184<H>  06-21    LIVER FUNCTIONS - ( 21 Jun 2019 09:52 )  Alb: x     / Pro: x     / ALK PHOS: x     / ALT: x     / AST: x     / GGT: 421 U/L         Gamma Glutamyl Transferase, Serum: 421 U/L (06-21 @ 09:52)  Triglycerides, Serum: 66 mg/dL (06-21 @ 06:10)    CARDIAC MARKERS ( 21 Jun 2019 06:10 )  <0.015 ng/mL / x     / 57 U/L / x     / <1.0 ng/mL      Stool Results:          RADIOLOGY RESULTS:    EXAM:  CT ABDOMEN AND PELVIS IC                            PROCEDURE DATE:  06/20/2019          INTERPRETATION:  CLINICAL INFORMATION: History of cholecystectomy.   Epigastric pain in vomiting, elevated LFT.    COMPARISON: Same day ultrasound    PROCEDURE:   Contiguous axial scan of the abdomen and pelvis with intravenous but   without oral contrast, followed by coronal and sagittal reformation. 90   mL of Omnipaque were administered without adverse reaction. 10 mL of   contrast were discarded.    FINDINGS:    LOWER CHEST: Bilateral breast implants without complicating features.    LIVER: Hepatic steatosis. No focal abnormality.  BILE DUCTS: Normal caliber. Stent in the common duct.  GALLBLADDER: Cholecystectomy clips.  SPLEEN: Within normal limits.  PANCREAS: Within normal limits.  ADRENALS: Within normal limits.  KIDNEYS/URETERS: Within normal limits.    BLADDER: Within normal limits.  REPRODUCTIVE ORGANS: Status post supracervical hysterectomy. Unremarkable   adnexa.    BOWEL: No bowel obstruction. Appendix is normal.  PERITONEUM: No ascites.  VESSELS:  Within normal limits.  RETROPERITONEUM: No lymphadenopathy.    ABDOMINAL WALL: Tiny fat-containing ventral hernia.  BONES: No acute abnormality. Vertebral hemangioma of T12. Bilateral   spondylolysis of L5.    IMPRESSION: No acute intra-abdominal abnormality to explain the   presenting symptoms.    Incidental findings including hepatic steatosis, biliary stent in place,   breast implants, bilateral spondylolysis of L5.                  MALGORZATA CALIX M.D., ATTENDING RADIOLOGIST  This document has been electronically signed. Jun 21 2019 12:38AM                SURGICAL PATHOLOGY:

## 2019-06-21 NOTE — H&P ADULT - PROBLEM SELECTOR PLAN 3
Patient has hx of hypercalcemia, she was told that her parathyroid levels are high and she needs surgery but she refused, she was also prescribed alendronate and furosemide but she does not take it  c/w IV hydration  F/u Vitamin D, PTH, Ionized Ca

## 2019-06-21 NOTE — H&P ADULT - ASSESSMENT
49 yr old F from home, ambulates independently, , originally from [place], with PMH of Pancreatitis, Hyperparathyroidism? ( Diagnosed in 2017, was prescribed Alendronate and Furosemide but refused medications and Surgery.  Serum Calcium in 2017: 11.8), Gallstones s/p Cholecystectomy ( August 2018), Internal hemorrhoids, Breast implants, Abdominoplasty, chronic constipation came with complain of abdominal pain x 1 day    Patient is admitted for abdominal pain

## 2019-06-21 NOTE — H&P ADULT - PROBLEM SELECTOR PLAN 2
p/w AST:888, ALT: 735, T bilirubin: 1.8 >> obstructive pattern of transminitis  CT abdomen shows hepatic steatosis and biliary stent  c/w IV hydration  Avoid hepatotoxic medications  Gastro consult  F/u Hepatitis panel, tyelnol level

## 2019-06-21 NOTE — CONSULT NOTE ADULT - ASSESSMENT
# Sepsis ( Tachycardia  + Leukocytosis + cholangitis)  # Cholangitis    would recommend:    1. Follow up Cultures  2. ERCP   3. Trend LFTS and bilirubin    will follow the patient with you and make further recommendation based on the clinical course and Lab results  Thank you for the opportunity to participate in Ms. DUTTA's care Patient is a 49y old  Female from home,  with h/o  Pancreatitis, Hyperparathyroidism? , Diagnosed in 2017, was prescribed Alendronate and Furosemide but refused medications and Surgery.  Gallstones s/p Cholecystectomy ( August 2018), Internal hemorrhoids, Breast implants, Abdominoplasty, chronic constipation, overactive bladder ( was on Mirabegron, s/p bladder surgery) presents to the ER for evaluation of  worsening epigastric pain, vomiting x3, numbness and tingling in her Left arm, chills.  She went to her gastroenterologist Dr. Echavarria who referred her in ER. On admission, she found to have  tachycardia, HR:119, Hypotensive,  BP 80/90, and Leukocytosis, WBC:14.07, Serum Ca: 11.7, and elevated LFts. The  CT abdomen and pelvis shows Ventral hernia, hepatic steatosis, Biliary stent in place.     # s/p Septic shock - responded to IVF  # Sepsis ( Tachycardia  + Leukocytosis + cholangitis)  # Cholangitis    would recommend:    1. Follow up Cultures  2. ERCP  and Monitor Temp. and c/w supportive care  3. Trend LFTS and bilirubin  4. Monitor WBC count  5. Continue Zosyn until work up is done    d/w Patient and Covering NP    will follow the patient with you and make further recommendation based on the clinical course and Lab results  Thank you for the opportunity to participate in Ms. DUTTA's care

## 2019-06-21 NOTE — H&P ADULT - NSICDXPASTSURGICALHX_GEN_ALL_CORE_FT
PAST SURGICAL HISTORY:  H/O abdominoplasty     H/O breast implant     History of biliary stent insertion     S/P cholecystectomy

## 2019-06-21 NOTE — H&P ADULT - GASTROINTESTINAL DETAILS
no organomegaly/bowel sounds normal/no guarding/no rebound tenderness/no rigidity/soft/Epigastric hernia

## 2019-06-21 NOTE — PROGRESS NOTE ADULT - PROBLEM SELECTOR PLAN 1
obstructive juandice, hx of cholecystectomy with stents that needs to be removed  - ERCP with stent removal on Monday   - Dr Buchanan consulted, recommendations appreciated  - clear liquid diet, NPO after midnight on Sunday   - pain management   - ID consult Dr Camara

## 2019-06-22 LAB
-  AMIKACIN: SIGNIFICANT CHANGE UP
-  AMOXICILLIN/CLAVULANIC ACID: SIGNIFICANT CHANGE UP
-  AMPICILLIN/SULBACTAM: SIGNIFICANT CHANGE UP
-  AMPICILLIN: SIGNIFICANT CHANGE UP
-  AZTREONAM: SIGNIFICANT CHANGE UP
-  CEFAZOLIN: SIGNIFICANT CHANGE UP
-  CEFEPIME: SIGNIFICANT CHANGE UP
-  CEFOXITIN: SIGNIFICANT CHANGE UP
-  CEFTRIAXONE: SIGNIFICANT CHANGE UP
-  CIPROFLOXACIN: SIGNIFICANT CHANGE UP
-  ERTAPENEM: SIGNIFICANT CHANGE UP
-  GENTAMICIN: SIGNIFICANT CHANGE UP
-  IMIPENEM: SIGNIFICANT CHANGE UP
-  LEVOFLOXACIN: SIGNIFICANT CHANGE UP
-  MEROPENEM: SIGNIFICANT CHANGE UP
-  NITROFURANTOIN: SIGNIFICANT CHANGE UP
-  PIPERACILLIN/TAZOBACTAM: SIGNIFICANT CHANGE UP
-  TIGECYCLINE: SIGNIFICANT CHANGE UP
-  TOBRAMYCIN: SIGNIFICANT CHANGE UP
-  TRIMETHOPRIM/SULFAMETHOXAZOLE: SIGNIFICANT CHANGE UP
ALBUMIN SERPL ELPH-MCNC: 3.4 G/DL — LOW (ref 3.5–5)
ALP SERPL-CCNC: 169 U/L — HIGH (ref 40–120)
ALT FLD-CCNC: 415 U/L DA — HIGH (ref 10–60)
ANION GAP SERPL CALC-SCNC: 8 MMOL/L — SIGNIFICANT CHANGE UP (ref 5–17)
AST SERPL-CCNC: 155 U/L — HIGH (ref 10–40)
BILIRUB SERPL-MCNC: 2.6 MG/DL — HIGH (ref 0.2–1.2)
BUN SERPL-MCNC: 8 MG/DL — SIGNIFICANT CHANGE UP (ref 7–18)
CALCIUM SERPL-MCNC: 10.7 MG/DL — HIGH (ref 8.4–10.5)
CHLORIDE SERPL-SCNC: 109 MMOL/L — HIGH (ref 96–108)
CO2 SERPL-SCNC: 22 MMOL/L — SIGNIFICANT CHANGE UP (ref 22–31)
CREAT SERPL-MCNC: 0.82 MG/DL — SIGNIFICANT CHANGE UP (ref 0.5–1.3)
CULTURE RESULTS: SIGNIFICANT CHANGE UP
GLUCOSE SERPL-MCNC: 84 MG/DL — SIGNIFICANT CHANGE UP (ref 70–99)
HCT VFR BLD CALC: 41.9 % — SIGNIFICANT CHANGE UP (ref 34.5–45)
HGB BLD-MCNC: 13.4 G/DL — SIGNIFICANT CHANGE UP (ref 11.5–15.5)
INR BLD: 1.23 RATIO — HIGH (ref 0.88–1.16)
MAGNESIUM SERPL-MCNC: 2.2 MG/DL — SIGNIFICANT CHANGE UP (ref 1.6–2.6)
MCHC RBC-ENTMCNC: 28.5 PG — SIGNIFICANT CHANGE UP (ref 27–34)
MCHC RBC-ENTMCNC: 32 GM/DL — SIGNIFICANT CHANGE UP (ref 32–36)
MCV RBC AUTO: 89 FL — SIGNIFICANT CHANGE UP (ref 80–100)
METHOD TYPE: SIGNIFICANT CHANGE UP
NRBC # BLD: 0 /100 WBCS — SIGNIFICANT CHANGE UP (ref 0–0)
ORGANISM # SPEC MICROSCOPIC CNT: SIGNIFICANT CHANGE UP
ORGANISM # SPEC MICROSCOPIC CNT: SIGNIFICANT CHANGE UP
PHOSPHATE SERPL-MCNC: 1.7 MG/DL — LOW (ref 2.5–4.5)
PLATELET # BLD AUTO: 194 K/UL — SIGNIFICANT CHANGE UP (ref 150–400)
POTASSIUM SERPL-MCNC: 3.5 MMOL/L — SIGNIFICANT CHANGE UP (ref 3.5–5.3)
POTASSIUM SERPL-SCNC: 3.5 MMOL/L — SIGNIFICANT CHANGE UP (ref 3.5–5.3)
PROT SERPL-MCNC: 7.1 G/DL — SIGNIFICANT CHANGE UP (ref 6–8.3)
PROTHROM AB SERPL-ACNC: 13.7 SEC — HIGH (ref 10–12.9)
RBC # BLD: 4.71 M/UL — SIGNIFICANT CHANGE UP (ref 3.8–5.2)
RBC # FLD: 11.9 % — SIGNIFICANT CHANGE UP (ref 10.3–14.5)
SODIUM SERPL-SCNC: 139 MMOL/L — SIGNIFICANT CHANGE UP (ref 135–145)
SPECIMEN SOURCE: SIGNIFICANT CHANGE UP
T3 SERPL-MCNC: 86 NG/DL — SIGNIFICANT CHANGE UP (ref 80–200)
T4 AB SER-ACNC: 9 UG/DL — SIGNIFICANT CHANGE UP (ref 4.6–12)
WBC # BLD: 7.43 K/UL — SIGNIFICANT CHANGE UP (ref 3.8–10.5)
WBC # FLD AUTO: 7.43 K/UL — SIGNIFICANT CHANGE UP (ref 3.8–10.5)

## 2019-06-22 PROCEDURE — 99233 SBSQ HOSP IP/OBS HIGH 50: CPT

## 2019-06-22 RX ORDER — POTASSIUM PHOSPHATE, MONOBASIC POTASSIUM PHOSPHATE, DIBASIC 236; 224 MG/ML; MG/ML
15 INJECTION, SOLUTION INTRAVENOUS ONCE
Refills: 0 | Status: COMPLETED | OUTPATIENT
Start: 2019-06-22 | End: 2019-06-22

## 2019-06-22 RX ORDER — FUROSEMIDE 40 MG
40 TABLET ORAL ONCE
Refills: 0 | Status: COMPLETED | OUTPATIENT
Start: 2019-06-22 | End: 2019-06-22

## 2019-06-22 RX ORDER — SODIUM CHLORIDE 9 MG/ML
1000 INJECTION, SOLUTION INTRAVENOUS
Refills: 0 | Status: DISCONTINUED | OUTPATIENT
Start: 2019-06-22 | End: 2019-06-24

## 2019-06-22 RX ADMIN — PIPERACILLIN AND TAZOBACTAM 25 GRAM(S): 4; .5 INJECTION, POWDER, LYOPHILIZED, FOR SOLUTION INTRAVENOUS at 17:45

## 2019-06-22 RX ADMIN — SODIUM CHLORIDE 100 MILLILITER(S): 9 INJECTION, SOLUTION INTRAVENOUS at 17:43

## 2019-06-22 RX ADMIN — PIPERACILLIN AND TAZOBACTAM 25 GRAM(S): 4; .5 INJECTION, POWDER, LYOPHILIZED, FOR SOLUTION INTRAVENOUS at 05:18

## 2019-06-22 RX ADMIN — Medication 40 MILLIGRAM(S): at 17:47

## 2019-06-22 RX ADMIN — POTASSIUM PHOSPHATE, MONOBASIC POTASSIUM PHOSPHATE, DIBASIC 62.5 MILLIMOLE(S): 236; 224 INJECTION, SOLUTION INTRAVENOUS at 17:44

## 2019-06-22 NOTE — PROGRESS NOTE ADULT - ASSESSMENT
Patient is a 49y old  Female from home,  with h/o  Pancreatitis, Hyperparathyroidism? , Diagnosed in 2017, was prescribed Alendronate and Furosemide but refused medications and Surgery.  Gallstones s/p Cholecystectomy ( August 2018), Internal hemorrhoids, Breast implants, Abdominoplasty, chronic constipation, overactive bladder ( was on Mirabegron, s/p bladder surgery) presents to the ER for evaluation of  worsening epigastric pain, vomiting x3, numbness and tingling in her Left arm, chills.  She went to her gastroenterologist Dr. Echavarria who referred her in ER. On admission, she found to have  tachycardia, HR:119, Hypotensive,  BP 80/90, and Leukocytosis, WBC:14.07, Serum Ca: 11.7, and elevated LFts. The  CT abdomen and pelvis shows Ventral hernia, hepatic steatosis, Biliary stent in place.     # s/p Septic shock - responded to IVF  # Sepsis ( Tachycardia  + Leukocytosis + cholangitis)  # Cholangitis    would recommend:    1. Follow up Cultures  2. ERCP  and Monitor Temp. and c/w supportive care  3. Trend LFTS and bilirubin  4. Monitor WBC count  5. Continue Zosyn until work up is done    d/w Patient and Covering NP    will follow the patient with you Patient is a 49y old  Female from home,  with h/o  Pancreatitis, Hyperparathyroidism? , Diagnosed in 2017, was prescribed Alendronate and Furosemide but refused medications and Surgery.  Gallstones s/p Cholecystectomy ( August 2018), Internal hemorrhoids, Breast implants, Abdominoplasty, chronic constipation, overactive bladder ( was on Mirabegron, s/p bladder surgery) presents to the ER for evaluation of  worsening epigastric pain, vomiting x3, numbness and tingling in her Left arm, chills.  She went to her gastroenterologist Dr. Echavarria who referred her in ER. On admission, she found to have  tachycardia, HR:119, Hypotensive,  BP 80/90, and Leukocytosis, WBC:14.07, Serum Ca: 11.7, and elevated LFts. The  CT abdomen and pelvis shows Ventral hernia, hepatic steatosis, Biliary stent in place.     # s/p Septic shock - responded to IVF  # Sepsis ( Tachycardia  + Leukocytosis + cholangitis)  # Cholangitis  # ?? UTI- E.coli    would recommend:    1. Advanced diet  2. ERCP  on Monday  3. Trend LFTS and bilirubin  4. Continue Zosyn until work up is done    d/w Patient and Covering NPNevin     will follow the patient with you

## 2019-06-22 NOTE — CONSULT NOTE ADULT - ASSESSMENT
49 yr old F from home, ambulates independently, , originally from [place], with PMH of Pancreatitis, Hyperparathyroidism? ( Diagnosed in 2017, was prescribed Alendronate and Furosemide but refused medications and Surgery.  Serum Calcium in 2017: 11.8), Gallstones s/p Cholecystectomy ( August 2018), Internal hemorrhoids, Breast implants, Abdominoplasty, chronic constipation, overactive bladder ( was on Mirabegron, s/p bladder surgery) came with complain of abdominal pain x 1 day.

## 2019-06-22 NOTE — PROGRESS NOTE ADULT - ASSESSMENT
Problem/Plan - 1:  ·  Problem:Sepsis Plan: 2/2 obstructive juandice, hx of cholecystectomy with stent  - ERCP with stent removal on Monday   - Dr Buchanan consulted, recommendations appreciated  - clear liquid diet, NPO after midnight on Sunday   - pain management   - ID consult Dr Camara. Tmax 100.8, cw Zosyn. Cultures in lab.      Problem/Plan - 2:  ·  Problem: Transaminitis.  Plan: initial AST:888, ALT: 735, trending down,  bilirubin: 2.6 likely due to obstruction    - c/w IV hydration  - Avoid hepatotoxic medications  - for ERCP with stent removal on Monday   - negative Hepatitis panel     Problem/Plan - 3:  ·  Problem: Hypercalcemia. primary hyperparathyroidism.   Pt is not compliant with  prescribed alendronate and furosemide   will give one dose lasix 40mg now. Endo consult pending.      Problem/Plan - 4:  ·  Problem: Prophylactic measure.  Plan: OOB ambulates.

## 2019-06-22 NOTE — PROGRESS NOTE ADULT - SUBJECTIVE AND OBJECTIVE BOX
REVIEW OF SYSTEMS: All other review systems are negative        ALLERGIES: No Known Allergies        Vital Signs Last 24 Hrs  T(C): 36.6 (2019 14:14), Max: 36.7 (2019 21:05)  T(F): 97.8 (2019 14:14), Max: 98 (2019 21:05)  HR: 79 (2019 14:14) (73 - 85)  BP: 126/90 (2019 14:14) (104/57 - 126/90)  BP(mean): --  RR: 17 (2019 14:14) (16 - 17)  SpO2: 100% (2019 14:14) (98% - 100%)        PHYSICAL EXAM:  GENERAL: Not in distress   CHEST/LUNG:  Air  entry bilaterally  HEART: s1 and s2 present  ABDOMEN:  Nontender and  Nondistended  EXTREMITIES: No pedal  edema  CNS: Awake and Alert        LABS:                          13.4   7.43  )-----------( 194      ( 2019 05:55 )             41.9                           14.1   13.96 )-----------( 167      ( 2019 06:10 )             43.7       06-22    139  |  109<H>  |  8   ----------------------------<  84  3.5   |  22  |  0.82    Ca    10.7<H>      2019 05:55  Phos  1.7     -22  Mg     2.2     -22    TPro  7.1  /  Alb  3.4<L>  /  TBili  2.6<H>  /  DBili  x   /  AST  155<H>  /  ALT  415<H>  /  AlkPhos  169<H>  -22    06-21    140  |  110<H>  |  8   ----------------------------<  105<H>  3.7   |  23  |  0.75    Ca    10.3      2019 06:10  Phos  1.8     06-21  Mg     1.9         TPro  7.1  /  Alb  3.6  /  TBili  3.1<H>  /  DBili  1.9<H>  /  AST  452<H>  /  ALT  679<H>  /  AlkPhos  184<H>          Urinalysis Basic - ( 2019 20:39 )  Color: Yellow / Appearance: Clear / S.010 / pH: x  Gluc: x / Ketone: Negative  / Bili: Small / Urobili: 8   Blood: x / Protein: Negative / Nitrite: Negative   Leuk Esterase: Negative / RBC: x / WBC x   Sq Epi: x / Non Sq Epi: x / Bacteria: x        MEDICATIONS  (STANDING):  dextrose 5% + sodium chloride 0.9%. 1000 milliLiter(s) (100 mL/Hr) IV Continuous <Continuous>  furosemide   Injectable 40 milliGRAM(s) IV Push once  piperacillin/tazobactam IVPB.. 3.375 Gram(s) IV Intermittent every 12 hours  potassium phosphate IVPB 15 milliMole(s) IV Intermittent once  sodium chloride 0.9%. 1000 milliLiter(s) (100 mL/Hr) IV Continuous <Continuous>    MEDICATIONS  (PRN):  ketorolac   Injectable 15 milliGRAM(s) IV Push every 8 hours PRN Severe Pain (7 - 10)  ondansetron Injectable 4 milliGRAM(s) IV Push every 8 hours PRN Nausea and/or Vomiting  traMADol 25 milliGRAM(s) Oral every 8 hours PRN Moderate Pain (4 - 6)      RADIOLOGY & ADDITIONAL TESTS:    19 : CT Abdomen and Pelvis w/ IV Cont (19 @ 23:50) : No acute intra-abdominal abnormality to explain the  presenting symptoms. Incidental findings including hepatic steatosis, biliary stent in place, breast implants, bilateral spondylolysis of L5.      MICROBIOLOGY DATA:      Culture - Urine (19 @ 00:29)    -  Amikacin: S <=8    -  Amoxicillin/Clavulanic Acid: S <=8/4    -  Ampicillin: S <=2 These ampicillin results predict results for amoxicillin    -  Ampicillin/Sulbactam: S <=4/2 Enterobacter, Citrobacter, and Serratia may develop resistance during prolonged therapy (3-4 days)    -  Aztreonam: S <=4    -  Cefazolin: S <=2 For uncomplicated UTI with K. pneumoniae, E. coli, or P. mirablis: TRACEY <=16 is sensitive and TRACEY >=32 is resistant. This also predicts results for oral agents cefaclor, cefdinir, cefpodoxime, cefprozil, cefuroxime axetil, cephalexin and locarbef for uncomplicated UTI. Note that some isolates may be susceptible to these agents while testing resistant to cefazolin.    -  Cefepime: S <=2    -  Cefoxitin: S <=4    -  Ceftriaxone: S <=1 Enterobacter, Citrobacter, and Serratia may develop resistance during prolonged therapy    -  Ciprofloxacin: S <=0.5    -  Ertapenem: S <=0.5    -  Gentamicin: S <=1    -  Imipenem: S <=1    -  Levofloxacin: S <=1    -  Meropenem: S <=1    -  Nitrofurantoin: S <=32 Should not be used to treat pyelonephritis    -  Piperacillin/Tazobactam: S <=8    -  Tigecycline: S <=1    -  Tobramycin: S <=2    -  Trimethoprim/Sulfamethoxazole: S <=0.5/9.5    Specimen Source: .Urine    Culture Results:   >100,000 CFU/ml Escherichia coli    Organism Identification: Escherichia coli    Organism: Escherichia coli    Method Type: TRACEY Patient is seen and examined at the bed side, is afebrile. She is doing much better, No abdominal pain. She is hungry and like to have regular diet.  The Leukocytosis trended down to normal.      REVIEW OF SYSTEMS: All other review systems are negative        ALLERGIES: No Known Allergies        Vital Signs Last 24 Hrs  T(C): 36.6 (2019 14:14), Max: 36.7 (2019 21:05)  T(F): 97.8 (2019 14:14), Max: 98 (2019 21:05)  HR: 79 (2019 14:14) (73 - 85)  BP: 126/90 (2019 14:14) (104/57 - 126/90)  BP(mean): --  RR: 17 (2019 14:14) (16 - 17)  SpO2: 100% (2019 14:14) (98% - 100%)        PHYSICAL EXAM:  GENERAL: Not in distress   CHEST/LUNG:  Air  entry bilaterally  HEART: s1 and s2 present  ABDOMEN:  Nontender and  Nondistended  EXTREMITIES: No pedal  edema  CNS: Awake and Alert        LABS:                          13.4   7.43  )-----------( 194      ( 2019 05:55 )             41.9                           14.1   13.96 )-----------( 167      ( 2019 06:10 )             43.7       -22    139  |  109<H>  |  8   ----------------------------<  84  3.5   |  22  |  0.82    Ca    10.7<H>      2019 05:55  Phos  1.7     22  Mg     2.2     22    TPro  7.1  /  Alb  3.4<L>  /  TBili  2.6<H>  /  DBili  x   /  AST  155<H>  /  ALT  415<H>  /  AlkPhos  169<H>  22    -21    140  |  110<H>  |  8   ----------------------------<  105<H>  3.7   |  23  |  0.75    Ca    10.3      2019 06:10  Phos  1.8       Mg     1.9         TPro  7.1  /  Alb  3.6  /  TBili  3.1<H>  /  DBili  1.9<H>  /  AST  452<H>  /  ALT  679<H>  /  AlkPhos  184<H>          Urinalysis Basic - ( 2019 20:39 )  Color: Yellow / Appearance: Clear / S.010 / pH: x  Gluc: x / Ketone: Negative  / Bili: Small / Urobili: 8   Blood: x / Protein: Negative / Nitrite: Negative   Leuk Esterase: Negative / RBC: x / WBC x   Sq Epi: x / Non Sq Epi: x / Bacteria: x        MEDICATIONS  (STANDING):  dextrose 5% + sodium chloride 0.9%. 1000 milliLiter(s) (100 mL/Hr) IV Continuous <Continuous>  furosemide   Injectable 40 milliGRAM(s) IV Push once  piperacillin/tazobactam IVPB.. 3.375 Gram(s) IV Intermittent every 12 hours  potassium phosphate IVPB 15 milliMole(s) IV Intermittent once  sodium chloride 0.9%. 1000 milliLiter(s) (100 mL/Hr) IV Continuous <Continuous>    MEDICATIONS  (PRN):  ketorolac   Injectable 15 milliGRAM(s) IV Push every 8 hours PRN Severe Pain (7 - 10)  ondansetron Injectable 4 milliGRAM(s) IV Push every 8 hours PRN Nausea and/or Vomiting  traMADol 25 milliGRAM(s) Oral every 8 hours PRN Moderate Pain (4 - 6)      RADIOLOGY & ADDITIONAL TESTS:    19 : CT Abdomen and Pelvis w/ IV Cont (19 @ 23:50) : No acute intra-abdominal abnormality to explain the  presenting symptoms. Incidental findings including hepatic steatosis, biliary stent in place, breast implants, bilateral spondylolysis of L5.      MICROBIOLOGY DATA:      Culture - Urine (19 @ 00:29)    -  Amikacin: S <=8    -  Amoxicillin/Clavulanic Acid: S <=8/4    -  Ampicillin: S <=2 These ampicillin results predict results for amoxicillin    -  Ampicillin/Sulbactam: S <=4/2 Enterobacter, Citrobacter, and Serratia may develop resistance during prolonged therapy (3-4 days)    -  Aztreonam: S <=4    -  Cefazolin: S <=2 For uncomplicated UTI with K. pneumoniae, E. coli, or P. mirablis: TRACEY <=16 is sensitive and TRACEY >=32 is resistant. This also predicts results for oral agents cefaclor, cefdinir, cefpodoxime, cefprozil, cefuroxime axetil, cephalexin and locarbef for uncomplicated UTI. Note that some isolates may be susceptible to these agents while testing resistant to cefazolin.    -  Cefepime: S <=2    -  Cefoxitin: S <=4    -  Ceftriaxone: S <=1 Enterobacter, Citrobacter, and Serratia may develop resistance during prolonged therapy    -  Ciprofloxacin: S <=0.5    -  Ertapenem: S <=0.5    -  Gentamicin: S <=1    -  Imipenem: S <=1    -  Levofloxacin: S <=1    -  Meropenem: S <=1    -  Nitrofurantoin: S <=32 Should not be used to treat pyelonephritis    -  Piperacillin/Tazobactam: S <=8    -  Tigecycline: S <=1    -  Tobramycin: S <=2    -  Trimethoprim/Sulfamethoxazole: S <=0.5/9.5    Specimen Source: .Urine    Culture Results:   >100,000 CFU/ml Escherichia coli    Organism Identification: Escherichia coli    Organism: Escherichia coli    Method Type: TRACEY

## 2019-06-22 NOTE — CONSULT NOTE ADULT - SUBJECTIVE AND OBJECTIVE BOX
Patient is a 49y old  Female who presents with a chief complaint of Abdominal pain (22 Jun 2019 17:27)      HPI:  49 yr old F from home, ambulates independently, , originally from [place], with PMH of Pancreatitis, Hyperparathyroidism? ( Diagnosed in 2017, was prescribed Alendronate and Furosemide but refused medications and Surgery.  Serum Calcium in 2017: 11.8), Gallstones s/p Cholecystectomy ( August 2018), Internal hemorrhoids, Breast implants, Abdominoplasty, chronic constipation, overactive bladder ( was on Mirabegron, s/p bladder surgery) came with complain of abdominal pain x 1 day.     Patient is Indonesian speaking used Interpreters ID: [id number], [id number] states she woke up in morning with sudden severe 10/10 constant epigastric abdominal pain radiating to back on Right side, and Right shoulder pain, which progressively got worse alma after eating fish. She reports of having NBNB vomiting x3, numbness and tingling in her Left arm, chills.  She went to her gastroenterologist Dr. Echavarria who referred her in ED.     Patient notes of having similar abdominal pain in Aug 2018 when she had Cholecystectomy in [facility], she was unaware that stent was placed at that time. In Feb 2019, she developed severe pain again. Her gastroenterologist ( Dr. Echavarria)  told her at that time that her ultrasound abdomen shows she has a Biliary stent which needs to removed. She was unsure and wanted to get records [facility] before stent removal. Colonoscopy and Endoscopy in Feb 2019 was done. Her gastroenterologist  told her that she has parasite? ( Probably H pylori)  in stomach, needs treatment with medications for 4 weeks. She does not remember name of medications, she took only one week but because of indigestion, she stopped taking medications. She denies fever, headache, diarrhea, dysuria, hx of renal stones or any other complains. Her last travel was in December 2019 to [place].    In ED, patient's vital signs were remarkable for HR:119, Lowest BP 80/90, EKG shows sinus tachycardia. Labs were remarkable for WBC:14.07, Serum Ca: 11.7, Total Protein 8.4, ALK PO: 212, AST:888, ALT: 735, Bilirubin 1.8, CT abdomen and pelvis shows Ventral hernia, hepatic steatosis, Biliary stent in place. Patient does not take any medications ( although she was prescribed Mirabegron, Lasix and Alendronate)    Goals of care: Full code    ***Called patient's pharmacy Di [phone] for confirmation of medications but no answer (21 Jun 2019 01:49)      PAST MEDICAL & SURGICAL HISTORY:  H/O cholecystitis  H/O hypercalcemia  History of hyperparathyroidism  Hepatitis  Pancreatitis  H/O abdominoplasty  H/O breast implant  History of biliary stent insertion  S/P cholecystectomy         MEDICATIONS  (STANDING):  dextrose 5% + sodium chloride 0.9%. 1000 milliLiter(s) (100 mL/Hr) IV Continuous <Continuous>  piperacillin/tazobactam IVPB.. 3.375 Gram(s) IV Intermittent every 12 hours  sodium chloride 0.9%. 1000 milliLiter(s) (100 mL/Hr) IV Continuous <Continuous>    MEDICATIONS  (PRN):  ketorolac   Injectable 15 milliGRAM(s) IV Push every 8 hours PRN Severe Pain (7 - 10)  ondansetron Injectable 4 milliGRAM(s) IV Push every 8 hours PRN Nausea and/or Vomiting  traMADol 25 milliGRAM(s) Oral every 8 hours PRN Moderate Pain (4 - 6)      FAMILY HISTORY:  FHx: diabetes mellitus      SOCIAL HISTORY:      REVIEW OF SYSTEMS:  CONSTITUTIONAL: No fever, weight loss, or fatigue  EYES: No eye pain, visual disturbances, or discharge  ENT:  No difficulty hearing, tinnitus, vertigo; No sinus or throat pain  NECK: No pain or stiffness  RESPIRATORY: No cough, wheezing, chills or hemoptysis; No Shortness of Breath  CARDIOVASCULAR: No chest pain, palpitations, passing out, dizziness, or leg swelling  GASTROINTESTINAL: No abdominal or epigastric pain. No nausea, vomiting, or hematemesis; No diarrhea or constipation. No melena or hematochezia.  GENITOURINARY: No dysuria, frequency, hematuria, or incontinence  NEUROLOGICAL: No headaches, memory loss, loss of strength, numbness, or tremors  SKIN: No itching, burning, rashes, or lesions   LYMPH Nodes: No enlarged glands  ENDOCRINE: No heat or cold intolerance; No hair loss  MUSCULOSKELETAL: No joint pain or swelling; No muscle, back, or extremity pain  PSYCHIATRIC: No depression, anxiety, mood swings, or difficulty sleeping  HEME/LYMPH: No easy bruising, or bleeding gums  ALLERGY AND IMMUNOLOGIC: No hives or eczema	        Vital Signs Last 24 Hrs  T(C): 36.6 (22 Jun 2019 14:14), Max: 36.6 (22 Jun 2019 05:25)  T(F): 97.8 (22 Jun 2019 14:14), Max: 97.8 (22 Jun 2019 05:25)  HR: 80 (22 Jun 2019 19:06) (73 - 80)  BP: 139/88 (22 Jun 2019 19:06) (104/57 - 139/88)  BP(mean): --  RR: 17 (22 Jun 2019 19:06) (16 - 17)  SpO2: 100% (22 Jun 2019 19:06) (98% - 100%)      Constitutional    HEENT: nad    Neck:  No JVD, bruits or thyromegaly    Respiratory:  Clear without rales or rhonchi    Cardiovascular:  RR without murmur, rub or gallop.    Gastrointestinal: Soft without hepatosplenomegaly.    Extremities: without cyanosis, clubbing or edema.    Neurological:  Oriented   x  3    . No gross sensory or motor defects.        LABS:                        13.4   7.43  )-----------( 194      ( 22 Jun 2019 05:55 )             41.9     06-22    139  |  109<H>  |  8   ----------------------------<  84  3.5   |  22  |  0.82    Ca    10.7<H>      22 Jun 2019 05:55  Phos  1.7     06-22  Mg     2.2     06-22    TPro  7.1  /  Alb  3.4<L>  /  TBili  2.6<H>  /  DBili  x   /  AST  155<H>  /  ALT  415<H>  /  AlkPhos  169<H>  06-22    CARDIAC MARKERS ( 21 Jun 2019 06:10 )  <0.015 ng/mL / x     / 57 U/L / x     / <1.0 ng/mL      PT/INR - ( 22 Jun 2019 05:55 )   PT: 13.7 sec;   INR: 1.23 ratio       Parathyroid Hormone Intact, Serum (06.21.19 @ 09:52)    Calcium.: 10.8: Test Repeated mg/dL    Intact PTH: 241: PTH METHOD: Roche      CAPILLARY BLOOD GLUCOSE          RADIOLOGY & ADDITIONAL STUDIES:

## 2019-06-22 NOTE — CONSULT NOTE ADULT - PROBLEM SELECTOR RECOMMENDATION 9
due to prim HPT  calcium stable on ivf  cont d5ns at 100cc  pth surgery d/w pt as out pt when stable   no intervention now

## 2019-06-22 NOTE — PROGRESS NOTE ADULT - SUBJECTIVE AND OBJECTIVE BOX
Patient is a 49y old  Female who presents with a chief complaint of Abdominal pain (2019 16:33)    HPI:  49 yr old F from home, ambulates independently, , originally from [place], with PMH of Pancreatitis, Hyperparathyroidism? ( Diagnosed in 2017, was prescribed Alendronate and Furosemide but refused medications and Surgery.  Serum Calcium in 2017: 11.8), Gallstones s/p Cholecystectomy ( 2018), Internal hemorrhoids, Breast implants, Abdominoplasty, chronic constipation, overactive bladder ( was on Mirabegron, s/p bladder surgery) came with complain of abdominal pain x 1 day.     Patient is Malay speaking used Interpreters ID: [id number], [id number] states she woke up in morning with sudden severe 10/10 constant epigastric abdominal pain radiating to back on Right side, and Right shoulder pain, which progressively got worse alma after eating fish. She reports of having NBNB vomiting x3, numbness and tingling in her Left arm, chills.  She went to her gastroenterologist Dr. Echavarria who referred her in ED.     Patient notes of having similar abdominal pain in Aug 2018 when she had Cholecystectomy in [facility], she was unaware that stent was placed at that time. In 2019, she developed severe pain again. Her gastroenterologist ( Dr. Echavarria)  told her at that time that her ultrasound abdomen shows she has a Biliary stent which needs to removed. She was unsure and wanted to get records from [facility] before stent removal. Colonoscopy and Endoscopy in 2019 was done. Her gastroenterologist  told her that she has parasite? ( Probably H pylori)  in stomach, needs treatment with medications for 4 weeks. She does not remember name of medications, she took only one week but because of indigestion, she stopped taking medications. She denies fever, headache, diarrhea, dysuria, hx of renal stones or any other complains. Her last travel was in 2019 to [place].    In ED, patient's vital signs were remarkable for HR:119, Lowest BP 80/90, EKG shows sinus tachycardia. Labs were remarkable for WBC:14.07, Serum Ca: 11.7, Total Protein 8.4, ALK PO: 212, AST:888, ALT: 735, Bilirubin 1.8, CT abdomen and pelvis shows Ventral hernia, hepatic steatosis, Biliary stent in place. Patient does not take any medications ( although she was prescribed Mirabegron, Lasix and Alendronate)  ***Called patient's pharmacy Di [phone] for confirmation of medications but no answer (2019 01:49)    Today:  Pt states she is hungry. Denies any abdominal pain. No nausea, vomiting, fevers.     PAST MEDICAL & SURGICAL HISTORY:  H/O cholecystitis  H/O hypercalcemia  History of hyperparathyroidism  Hepatitis  Pancreatitis  H/O abdominoplasty  H/O breast implant  History of biliary stent insertion  S/P cholecystectomy    MEDICATIONS  (STANDING):  piperacillin/tazobactam IVPB.. 3.375 Gram(s) IV Intermittent every 12 hours  sodium chloride 0.9%. 1000 milliLiter(s) (100 mL/Hr) IV Continuous <Continuous>    MEDICATIONS  (PRN):  ketorolac   Injectable 15 milliGRAM(s) IV Push every 8 hours PRN Severe Pain (7 - 10)  ondansetron Injectable 4 milliGRAM(s) IV Push every 8 hours PRN Nausea and/or Vomiting  traMADol 25 milliGRAM(s) Oral every 8 hours PRN Moderate Pain (4 - 6)    EXAM:  Vital Signs Last 24 Hrs  T(C): 36.6 (2019 14:14), Max: 36.7 (2019 21:05)  T(F): 97.8 (2019 14:14), Max: 98 (2019 21:05)  HR: 79 (2019 14:14) (73 - 85)  BP: 126/90 (2019 14:14) (104/57 - 126/90)  BP(mean): --  RR: 17 (2019 14:14) (16 - 17)  SpO2: 100% (2019 14:14) (98% - 100%)    PHYSICAL EXAM:  Constitutional: awake in chair, nad.   ENMT: patent nares, moist mucus membranes  Neck: supple  Respiratory: bilaterally clear to auscultation, no wheezing, no rhonchi, no crackles, no decreased air entry  Cardiovascular: s1s2, rrr, no murmurs.   Gastrointestinal: soft, +tenderness to palpation in epigastric region, no rebound, no guarding.    Extremities: no edema.   Neurological: alert and oriented to person, date and place.   Skin: intact no rash, warm to touch.   Musculoskeletal: moves all 4 extremities  Psychiatric: appropriate affect.    LABS:  Urinalysis Basic - ( 2019 20:39 )  Color: Yellow / Appearance: Clear / S.010 / pH: x  Gluc: x / Ketone: Negative  / Bili: Small / Urobili: 8   Blood: x / Protein: Negative / Nitrite: Negative   Leuk Esterase: Negative / RBC: x / WBC x   Sq Epi: x / Non Sq Epi: x / Bacteria: x    PT/INR - ( 2019 05:55 )   PT: 13.7 sec;   INR: 1.23 ratio      LIVER FUNCTIONS - ( 2019 05:55 )  Alb: 3.4 g/dL / Pro: 7.1 g/dL / ALK PHOS: 169 U/L / ALT: 415 U/L DA / AST: 155 U/L / GGT: x                               13.4   7.43  )-----------( 194      ( 2019 05:55 )             41.9   06-22    139  |  109<H>  |  8   ----------------------------<  84  3.5   |  22  |  0.82    Ca    10.7<H>      2019 05:55  Phos  1.7     -  Mg     2.2     -22    TPro  7.1  /  Alb  3.4<L>  /  TBili  2.6<H>  /  DBili  x   /  AST  155<H>  /  ALT  415<H>  /  AlkPhos  169<H>  -22    CARDIAC MARKERS ( 2019 06:10 )  <0.015 ng/mL / x     / 57 U/L / x     / <1.0 ng/mL    Chart reviewed.   Labs reviewed.  Imaging reviewed.   Plan discussed with consultants.

## 2019-06-23 LAB
ALBUMIN SERPL ELPH-MCNC: 3.5 G/DL — SIGNIFICANT CHANGE UP (ref 3.5–5)
ALP SERPL-CCNC: 177 U/L — HIGH (ref 40–120)
ALT FLD-CCNC: 329 U/L DA — HIGH (ref 10–60)
ANION GAP SERPL CALC-SCNC: 9 MMOL/L — SIGNIFICANT CHANGE UP (ref 5–17)
AST SERPL-CCNC: 88 U/L — HIGH (ref 10–40)
BILIRUB SERPL-MCNC: 1.4 MG/DL — HIGH (ref 0.2–1.2)
BUN SERPL-MCNC: 11 MG/DL — SIGNIFICANT CHANGE UP (ref 7–18)
CALCIUM SERPL-MCNC: 10.8 MG/DL — HIGH (ref 8.4–10.5)
CHLORIDE SERPL-SCNC: 106 MMOL/L — SIGNIFICANT CHANGE UP (ref 96–108)
CO2 SERPL-SCNC: 23 MMOL/L — SIGNIFICANT CHANGE UP (ref 22–31)
CREAT SERPL-MCNC: 0.76 MG/DL — SIGNIFICANT CHANGE UP (ref 0.5–1.3)
GLUCOSE SERPL-MCNC: 102 MG/DL — HIGH (ref 70–99)
HCT VFR BLD CALC: 42.5 % — SIGNIFICANT CHANGE UP (ref 34.5–45)
HGB BLD-MCNC: 14.1 G/DL — SIGNIFICANT CHANGE UP (ref 11.5–15.5)
MCHC RBC-ENTMCNC: 28.7 PG — SIGNIFICANT CHANGE UP (ref 27–34)
MCHC RBC-ENTMCNC: 33.2 GM/DL — SIGNIFICANT CHANGE UP (ref 32–36)
MCV RBC AUTO: 86.6 FL — SIGNIFICANT CHANGE UP (ref 80–100)
NRBC # BLD: 0 /100 WBCS — SIGNIFICANT CHANGE UP (ref 0–0)
PHOSPHATE SERPL-MCNC: 3 MG/DL — SIGNIFICANT CHANGE UP (ref 2.5–4.5)
PLATELET # BLD AUTO: 224 K/UL — SIGNIFICANT CHANGE UP (ref 150–400)
POTASSIUM SERPL-MCNC: 3.5 MMOL/L — SIGNIFICANT CHANGE UP (ref 3.5–5.3)
POTASSIUM SERPL-SCNC: 3.5 MMOL/L — SIGNIFICANT CHANGE UP (ref 3.5–5.3)
PROT SERPL-MCNC: 7.6 G/DL — SIGNIFICANT CHANGE UP (ref 6–8.3)
RBC # BLD: 4.91 M/UL — SIGNIFICANT CHANGE UP (ref 3.8–5.2)
RBC # FLD: 11.9 % — SIGNIFICANT CHANGE UP (ref 10.3–14.5)
SODIUM SERPL-SCNC: 138 MMOL/L — SIGNIFICANT CHANGE UP (ref 135–145)
WBC # BLD: 5.86 K/UL — SIGNIFICANT CHANGE UP (ref 3.8–10.5)
WBC # FLD AUTO: 5.86 K/UL — SIGNIFICANT CHANGE UP (ref 3.8–10.5)

## 2019-06-23 PROCEDURE — 99233 SBSQ HOSP IP/OBS HIGH 50: CPT | Mod: GC

## 2019-06-23 RX ADMIN — PIPERACILLIN AND TAZOBACTAM 25 GRAM(S): 4; .5 INJECTION, POWDER, LYOPHILIZED, FOR SOLUTION INTRAVENOUS at 17:37

## 2019-06-23 RX ADMIN — PIPERACILLIN AND TAZOBACTAM 25 GRAM(S): 4; .5 INJECTION, POWDER, LYOPHILIZED, FOR SOLUTION INTRAVENOUS at 05:14

## 2019-06-23 NOTE — PROGRESS NOTE ADULT - SUBJECTIVE AND OBJECTIVE BOX
Patient is seen and examined at the bed side, is afebrile. She is doing much better, No abdominal pain. She is hungry and like to have regular diet.  The Leukocytosis trended down to normal.      REVIEW OF SYSTEMS: All other review systems are negative        ALLERGIES: No Known Allergies        Vital Signs Last 24 Hrs  T(C): 36.5 (2019 14:19), Max: 36.8 (2019 22:17)  T(F): 97.7 (2019 14:19), Max: 98.3 (2019 05:05)  HR: 100 (2019 14:19) (80 - 100)  BP: 106/74 (2019 14:19) (106/74 - 139/88)  BP(mean): --  RR: 17 (2019 14:19) (16 - 17)  SpO2: 100% (2019 14:19) (99% - 100%)      PHYSICAL EXAM:  GENERAL: Not in distress   CHEST/LUNG:  Air  entry bilaterally  HEART: s1 and s2 present  ABDOMEN:  Nontender and  Nondistended  EXTREMITIES: No pedal  edema  CNS: Awake and Alert        LABS:                        14.1   5.86  )-----------( 224      ( 2019 06:18 )             42.5                           13.4   7.43  )-----------( 194      ( 2019 05:55 )             41.9                           14.1   13.96 )-----------( 167      ( 2019 06:10 )             43.7             138  |  106  |  11  ----------------------------<  102<H>  3.5   |  23  |  0.76    Ca    10.8<H>      2019 06:18  Phos  3.0       Mg     2.2         TPro  7.6  /  Alb  3.5  /  TBili  1.4<H>  /  DBili  x   /  AST  88<H>  /  ALT  329<H>  /  AlkPhos  177<H>  06-    06-    139  |  109<H>  |  8   ----------------------------<  84  3.5   |  22  |  0.82    Ca    10.7<H>      2019 05:55  Phos  1.7       Mg     2.2         TPro  7.1  /  Alb  3.4<L>  /  TBili  2.6<H>  /  DBili  x   /  AST  155<H>  /  ALT  415<H>  /  AlkPhos  169<H>        Urinalysis Basic - ( 2019 20:39 )  Color: Yellow / Appearance: Clear / S.010 / pH: x  Gluc: x / Ketone: Negative  / Bili: Small / Urobili: 8   Blood: x / Protein: Negative / Nitrite: Negative   Leuk Esterase: Negative / RBC: x / WBC x   Sq Epi: x / Non Sq Epi: x / Bacteria: x      MEDICATIONS  (STANDING):  dextrose 5% + sodium chloride 0.9%. 1000 milliLiter(s) (100 mL/Hr) IV Continuous <Continuous>  piperacillin/tazobactam IVPB.. 3.375 Gram(s) IV Intermittent every 12 hours  sodium chloride 0.9%. 1000 milliLiter(s) (100 mL/Hr) IV Continuous <Continuous>    MEDICATIONS  (PRN):  ondansetron Injectable 4 milliGRAM(s) IV Push every 8 hours PRN Nausea and/or Vomiting  traMADol 25 milliGRAM(s) Oral every 8 hours PRN Moderate Pain (4 - 6)      RADIOLOGY & ADDITIONAL TESTS:    19 : CT Abdomen and Pelvis w/ IV Cont (19 @ 23:50) : No acute intra-abdominal abnormality to explain the  presenting symptoms. Incidental findings including hepatic steatosis, biliary stent in place, breast implants, bilateral spondylolysis of L5.      MICROBIOLOGY DATA:      Culture - Urine (19 @ 00:29)    -  Amikacin: S <=8    -  Amoxicillin/Clavulanic Acid: S <=8/4    -  Ampicillin: S <=2 These ampicillin results predict results for amoxicillin    -  Ampicillin/Sulbactam: S <=4/2 Enterobacter, Citrobacter, and Serratia may develop resistance during prolonged therapy (3-4 days)    -  Aztreonam: S <=4    -  Cefazolin: S <=2 For uncomplicated UTI with K. pneumoniae, E. coli, or P. mirablis: TRACEY <=16 is sensitive and TRACEY >=32 is resistant. This also predicts results for oral agents cefaclor, cefdinir, cefpodoxime, cefprozil, cefuroxime axetil, cephalexin and locarbef for uncomplicated UTI. Note that some isolates may be susceptible to these agents while testing resistant to cefazolin.    -  Cefepime: S <=2    -  Cefoxitin: S <=4    -  Ceftriaxone: S <=1 Enterobacter, Citrobacter, and Serratia may develop resistance during prolonged therapy    -  Ciprofloxacin: S <=0.5    -  Ertapenem: S <=0.5    -  Gentamicin: S <=1    -  Imipenem: S <=1    -  Levofloxacin: S <=1    -  Meropenem: S <=1    -  Nitrofurantoin: S <=32 Should not be used to treat pyelonephritis    -  Piperacillin/Tazobactam: S <=8    -  Tigecycline: S <=1    -  Tobramycin: S <=2    -  Trimethoprim/Sulfamethoxazole: S <=0.5/9.5    Specimen Source: .Urine    Culture Results:   >100,000 CFU/ml Escherichia coli    Organism Identification: Escherichia coli    Organism: Escherichia coli    Method Type: TRACEY Patient is seen and examined at the bed side, is afebrile. She is tolerating regular diet well. The WBC count stay normal.        REVIEW OF SYSTEMS: All other review systems are negative        ALLERGIES: No Known Allergies        Vital Signs Last 24 Hrs  T(C): 36.5 (2019 14:19), Max: 36.8 (2019 22:17)  T(F): 97.7 (2019 14:19), Max: 98.3 (2019 05:05)  HR: 100 (2019 14:19) (80 - 100)  BP: 106/74 (2019 14:19) (106/74 - 139/88)  BP(mean): --  RR: 17 (2019 14:19) (16 - 17)  SpO2: 100% (2019 14:19) (99% - 100%)        PHYSICAL EXAM:  GENERAL: Not in distress   CHEST/LUNG:  Air  entry bilaterally  HEART: s1 and s2 present  ABDOMEN:  Nontender and  Nondistended  EXTREMITIES: No pedal  edema  CNS: Awake and Alert        LABS:                        14.1   5.86  )-----------( 224      ( 2019 06:18 )             42.5                           13.4   7.43  )-----------( 194      ( 2019 05:55 )             41.9                           14.1   13.96 )-----------( 167      ( 2019 06:10 )             43.7             138  |  106  |  11  ----------------------------<  102<H>  3.5   |  23  |  0.76    Ca    10.8<H>      2019 06:18  Phos  3.0       Mg     2.2         TPro  7.6  /  Alb  3.5  /  TBili  1.4<H>  /  DBili  x   /  AST  88<H>  /  ALT  329<H>  /  AlkPhos  177<H>  -    139  |  109<H>  |  8   ----------------------------<  84  3.5   |  22  |  0.82    Ca    10.7<H>      2019 05:55  Phos  1.7       Mg     2.2         TPro  7.1  /  Alb  3.4<L>  /  TBili  2.6<H>  /  DBili  x   /  AST  155<H>  /  ALT  415<H>  /  AlkPhos  169<H>        Urinalysis Basic - ( 2019 20:39 )  Color: Yellow / Appearance: Clear / S.010 / pH: x  Gluc: x / Ketone: Negative  / Bili: Small / Urobili: 8   Blood: x / Protein: Negative / Nitrite: Negative   Leuk Esterase: Negative / RBC: x / WBC x   Sq Epi: x / Non Sq Epi: x / Bacteria: x      MEDICATIONS  (STANDING):  dextrose 5% + sodium chloride 0.9%. 1000 milliLiter(s) (100 mL/Hr) IV Continuous <Continuous>  piperacillin/tazobactam IVPB.. 3.375 Gram(s) IV Intermittent every 12 hours  sodium chloride 0.9%. 1000 milliLiter(s) (100 mL/Hr) IV Continuous <Continuous>    MEDICATIONS  (PRN):  ondansetron Injectable 4 milliGRAM(s) IV Push every 8 hours PRN Nausea and/or Vomiting  traMADol 25 milliGRAM(s) Oral every 8 hours PRN Moderate Pain (4 - 6)      RADIOLOGY & ADDITIONAL TESTS:    19 : CT Abdomen and Pelvis w/ IV Cont (19 @ 23:50) : No acute intra-abdominal abnormality to explain the  presenting symptoms. Incidental findings including hepatic steatosis, biliary stent in place, breast implants, bilateral spondylolysis of L5.      MICROBIOLOGY DATA:      Culture - Urine (19 @ 00:29)    -  Amikacin: S <=8    -  Amoxicillin/Clavulanic Acid: S <=8/4    -  Ampicillin: S <=2 These ampicillin results predict results for amoxicillin    -  Ampicillin/Sulbactam: S <=4/2 Enterobacter, Citrobacter, and Serratia may develop resistance during prolonged therapy (3-4 days)    -  Aztreonam: S <=4    -  Cefazolin: S <=2 For uncomplicated UTI with K. pneumoniae, E. coli, or P. mirablis: TRACEY <=16 is sensitive and TRACEY >=32 is resistant. This also predicts results for oral agents cefaclor, cefdinir, cefpodoxime, cefprozil, cefuroxime axetil, cephalexin and locarbef for uncomplicated UTI. Note that some isolates may be susceptible to these agents while testing resistant to cefazolin.    -  Cefepime: S <=2    -  Cefoxitin: S <=4    -  Ceftriaxone: S <=1 Enterobacter, Citrobacter, and Serratia may develop resistance during prolonged therapy    -  Ciprofloxacin: S <=0.5    -  Ertapenem: S <=0.5    -  Gentamicin: S <=1    -  Imipenem: S <=1    -  Levofloxacin: S <=1    -  Meropenem: S <=1    -  Nitrofurantoin: S <=32 Should not be used to treat pyelonephritis    -  Piperacillin/Tazobactam: S <=8    -  Tigecycline: S <=1    -  Tobramycin: S <=2    -  Trimethoprim/Sulfamethoxazole: S <=0.5/9.5    Specimen Source: .Urine    Culture Results:   >100,000 CFU/ml Escherichia coli    Organism Identification: Escherichia coli    Organism: Escherichia coli    Method Type: TRACEY

## 2019-06-23 NOTE — PROGRESS NOTE ADULT - SUBJECTIVE AND OBJECTIVE BOX
Patient is a 49y old  Female who presents with a chief complaint of Abdominal pain (24 Jun 2019 08:11)      INTERVAL HPI/OVERNIGHT EVENTS: no new complaints. Has been eating and tolerating oral intake well. No nausea/vomiting/ fevers/chills    MEDICATIONS  (STANDING):  dextrose 5% + sodium chloride 0.9%. 1000 milliLiter(s) (100 mL/Hr) IV Continuous <Continuous>  piperacillin/tazobactam IVPB.. 3.375 Gram(s) IV Intermittent every 12 hours  sodium chloride 0.9%. 1000 milliLiter(s) (100 mL/Hr) IV Continuous <Continuous>    MEDICATIONS  (PRN):  ondansetron Injectable 4 milliGRAM(s) IV Push every 8 hours PRN Nausea and/or Vomiting  traMADol 25 milliGRAM(s) Oral every 8 hours PRN Moderate Pain (4 - 6)      __________________________________________________  REVIEW OF SYSTEMS:    CONSTITUTIONAL: No fever,   EYES: no acute visual disturbances  NECK: No pain or stiffness  RESPIRATORY: No cough; No shortness of breath  CARDIOVASCULAR: No chest pain, no palpitations  GASTROINTESTINAL: No pain. No nausea or vomiting; No diarrhea   NEUROLOGICAL: No headache or numbness, no tremors  MUSCULOSKELETAL: No joint pain, no muscle pain  GENITOURINARY: no dysuria, no frequency, no hesitancy  PSYCHIATRY: no depression , no anxiety  ALL OTHER  ROS negative        Vital Signs Last 24 Hrs  T(C): 36.8 (24 Jun 2019 05:15), Max: 36.8 (24 Jun 2019 05:15)  T(F): 98.2 (24 Jun 2019 05:15), Max: 98.2 (24 Jun 2019 05:15)  HR: 96 (24 Jun 2019 05:15) (93 - 100)  BP: 112/76 (24 Jun 2019 05:15) (106/74 - 112/76)  BP(mean): --  RR: 18 (24 Jun 2019 05:15) (16 - 18)  SpO2: 100% (24 Jun 2019 05:15) (100% - 100%)    ________________________________________________  PHYSICAL EXAM:  GENERAL: NAD  HEENT: Normocephalic;  conjunctivae and sclerae clear; moist mucous membranes;   NECK : supple  CHEST/LUNG: Clear to auscultation bilaterally with good air entry   HEART: S1 S2  regular; no murmurs, gallops or rubs  ABDOMEN: Soft, Nontender, Nondistended; Bowel sounds present  EXTREMITIES: no cyanosis; no edema; no calf tenderness  SKIN: warm and dry; no rash  NERVOUS SYSTEM:  Awake and alert; Oriented  to place, person and time ; no new deficits    _________________________________________________  LABS:                        13.8   5.71  )-----------( 246      ( 24 Jun 2019 07:52 )             42.2     06-23    138  |  106  |  11  ----------------------------<  102<H>  3.5   |  23  |  0.76    Ca    10.8<H>      23 Jun 2019 06:18  Phos  3.0     06-23    TPro  7.6  /  Alb  3.5  /  TBili  1.4<H>  /  DBili  x   /  AST  88<H>  /  ALT  329<H>  /  AlkPhos  177<H>  06-23    PT/INR - ( 24 Jun 2019 07:52 )   PT: 10.9 sec;   INR: 0.98 ratio         PTT - ( 24 Jun 2019 07:52 )  PTT:32.0 sec    CAPILLARY BLOOD GLUCOSE            RADIOLOGY & ADDITIONAL TESTS:    Imaging Personally Reviewed:  YES/NO    Consultant(s) Notes Reviewed:   YES/ No    Care Discussed with Consultants :     Plan of care was discussed with patient and /or primary care giver; all questions and concerns were addressed and care was aligned with patient's wishes.

## 2019-06-23 NOTE — PROGRESS NOTE ADULT - ASSESSMENT
Patient is a 49y old  Female from home,  with h/o  Pancreatitis, Hyperparathyroidism? , Diagnosed in 2017, was prescribed Alendronate and Furosemide but refused medications and Surgery.  Gallstones s/p Cholecystectomy ( August 2018), Internal hemorrhoids, Breast implants, Abdominoplasty, chronic constipation, overactive bladder ( was on Mirabegron, s/p bladder surgery) presents to the ER for evaluation of  worsening epigastric pain, vomiting x3, numbness and tingling in her Left arm, chills.  She went to her gastroenterologist Dr. Echavarria who referred her in ER. On admission, she found to have  tachycardia, HR:119, Hypotensive,  BP 80/90, and Leukocytosis, WBC:14.07, Serum Ca: 11.7, and elevated LFts. The  CT abdomen and pelvis shows Ventral hernia, hepatic steatosis, Biliary stent in place.     # s/p Septic shock - responded to IVF  # Sepsis ( Tachycardia  + Leukocytosis + cholangitis)  # Cholangitis  # ?? UTI- E.coli    would recommend:    1. Advanced diet  2. ERCP  on Monday  3. Trend LFTS and bilirubin  4. Continue Zosyn until work up is done    d/w Patient and Covering NP,    will follow the patient with you Patient is a 49y old  Female from home,  with h/o  Pancreatitis, Hyperparathyroidism? , Diagnosed in 2017, was prescribed Alendronate and Furosemide but refused medications and Surgery.  Gallstones s/p Cholecystectomy ( August 2018), Internal hemorrhoids, Breast implants, Abdominoplasty, chronic constipation, overactive bladder ( was on Mirabegron, s/p bladder surgery) presents to the ER for evaluation of  worsening epigastric pain, vomiting x3, numbness and tingling in her Left arm, chills.  She went to her gastroenterologist Dr. Echavarria who referred her in ER. On admission, she found to have  tachycardia, HR:119, Hypotensive,  BP 80/90, and Leukocytosis, WBC:14.07, Serum Ca: 11.7, and elevated LFts. The  CT abdomen and pelvis shows Ventral hernia, hepatic steatosis, Biliary stent in place.     # s/p Septic shock - responded to IVF  # Sepsis ( Tachycardia  + Leukocytosis + cholangitis)  # Cholangitis  # ?? UTI- E.coli    would recommend:    1. NPO after midnight for ERCP  in AM  2. Continue Zosyn until work up is done  3. OOB to chair     d/w Patient and DR. Sneed    will follow the patient with you

## 2019-06-23 NOTE — CHART NOTE - NSCHARTNOTEFT_GEN_A_CORE
EVENT: Diet  - Overnight, Dr Camara  requested pt's diet to be changed from clear to regular diet. Md. Camara stated pt needs to eat.    OBJECTIVE:  Vital Signs Last 24 Hrs  T(C): 36.8 (23 Jun 2019 05:05), Max: 36.8 (22 Jun 2019 22:17)  T(F): 98.3 (23 Jun 2019 05:05), Max: 98.3 (23 Jun 2019 05:05)  HR: 80 (23 Jun 2019 05:05) (79 - 99)  BP: 116/76 (23 Jun 2019 05:05) (109/85 - 139/88)  BP(mean): --  RR: 16 (23 Jun 2019 05:05) (16 - 17)  SpO2: 100% (23 Jun 2019 05:05) (99% - 100%)    FOCUSED PHYSICAL EXAM:  Neuro: awake, alert, oriented x 3. No neuro deficit  Cardiovascular: Pulses +2 B/L in lower and upper extremities, HR regular, BP stable, No edema.  Respiratory: Respirations regular, unlabored, breath sounds clear B/L.   GI: Abdomen soft, non-tender, positive bowel sounds.  : no bladder distention noted. No complaints at this time.  Skin: Dry, intact, no bruising, no diaphoresis.    LABS:                        14.1   5.86  )-----------( 224      ( 23 Jun 2019 06:18 )             42.5     06-22    139  |  109<H>  |  8   ----------------------------<  84  3.5   |  22  |  0.82    Ca    10.7<H>      22 Jun 2019 05:55  Phos  1.7     06-22  Mg     2.2     06-22    TPro  7.1  /  Alb  3.4<L>  /  TBili  2.6<H>  /  DBili  x   /  AST  155<H>  /  ALT  415<H>  /  AlkPhos  169<H>  06-22        ASSESSMENT:  HPI:  49 yr old F from home, ambulates independently, , originally from [place], with PMH of Pancreatitis, Hyperparathyroidism? ( Diagnosed in 2017, was prescribed Alendronate and Furosemide but refused medications and Surgery.  Serum Calcium in 2017: 11.8), Gallstones s/p Cholecystectomy ( August 2018), Internal hemorrhoids, Breast implants, Abdominoplasty, chronic constipation, overactive bladder ( was on Mirabegron, s/p bladder surgery) came with complain of abdominal pain x 1 day.     Patient is German speaking used Interpreters ID: [id number], [id number] states she woke up in morning with sudden severe 10/10 constant epigastric abdominal pain radiating to back on Right side, and Right shoulder pain, which progressively got worse alma after eating fish. She reports of having NBNB vomiting x3, numbness and tingling in her Left arm, chills.  She went to her gastroenterologist Dr. Echavarria who referred her in ED.     Patient notes of having similar abdominal pain in Aug 2018 when she had Cholecystectomy in [facility], she was unaware that stent was placed at that time. In Feb 2019, she developed severe pain again. Her gastroenterologist ( Dr. Echavarria)  told her at that time that her ultrasound abdomen shows she has a Biliary stent which needs to removed. She was unsure and wanted to get records from [facility] before stent removal. Colonoscopy and Endoscopy in Feb 2019 was done. Her gastroenterologist  told her that she has parasite? ( Probably H pylori)  in stomach, needs treatment with medications for 4 weeks. She does not remember name of medications, she took only one week but because of indigestion, she stopped taking medications. She denies fever, headache, diarrhea, dysuria, hx of renal stones or any other complains. Her last travel was in December 2019 to [place].    In ED, patient's vital signs were remarkable for HR:119, Lowest BP 80/90, EKG shows sinus tachycardia. Labs were remarkable for WBC:14.07, Serum Ca: 11.7, Total Protein 8.4, ALK PO: 212, AST:888, ALT: 735, Bilirubin 1.8, CT abdomen and pelvis shows Ventral hernia, hepatic steatosis, Biliary stent in place. Patient does not take any medications ( although she was prescribed Mirabegron, Lasix and Alendronate)    Goals of care: Full code    ***Called patient's pharmacy [facility] [phone] for confirmation of medications but no answer (21 Jun 2019 01:49)      PLAN: Regular diet ordered    FOLLOW UP / RESULT:

## 2019-06-23 NOTE — PROGRESS NOTE ADULT - ASSESSMENT
Problem/Plan - 1: SEPSIS, OBStructive jaundice, Cholangitis: now afebrile  ·  Problem:Sepsis Plan: 2/2 obstructive juandice, hx of cholecystectomy with stent  - ERCP with stent removal on Monday   - Dr Buchanan consulted, recommendations appreciated  - clear liquid diet, NPO after midnight on Sunday   - pain management   - ID consult Dr Camara. Tmax 100.8, cw Zosyn. Cultures in lab.      Problem/Plan - 2:  ·  Problem: Transaminitis.  Plan: initial AST:888, ALT: 735, trending down,  bilirubin: 2.6 likely due to obstruction    - c/w IV hydration  - Avoid hepatotoxic medications  - for ERCP with stent removal on Monday   - negative Hepatitis panel     Problem/Plan - 3:  ·  Problem: Hypercalcemia. primary hyperparathyroidism.   Pt is not compliant with  prescribed alendronate and furosemide   -c/w IVF hydration  no medical contraindications to planned low risk procedure     Problem/Plan - 4:  ·  Problem: Prophylactic measure.  Plan: OOB ambulates.

## 2019-06-24 DIAGNOSIS — E55.9 VITAMIN D DEFICIENCY, UNSPECIFIED: ICD-10-CM

## 2019-06-24 DIAGNOSIS — R94.5 ABNORMAL RESULTS OF LIVER FUNCTION STUDIES: ICD-10-CM

## 2019-06-24 DIAGNOSIS — K83.09 OTHER CHOLANGITIS: ICD-10-CM

## 2019-06-24 LAB
ALBUMIN SERPL ELPH-MCNC: 3 G/DL — LOW (ref 3.5–5)
ALP SERPL-CCNC: 158 U/L — HIGH (ref 40–120)
ALT FLD-CCNC: 240 U/L DA — HIGH (ref 10–60)
ANION GAP SERPL CALC-SCNC: 7 MMOL/L — SIGNIFICANT CHANGE UP (ref 5–17)
APTT BLD: 32 SEC — SIGNIFICANT CHANGE UP (ref 27.5–36.3)
AST SERPL-CCNC: 63 U/L — HIGH (ref 10–40)
BILIRUB SERPL-MCNC: 0.8 MG/DL — SIGNIFICANT CHANGE UP (ref 0.2–1.2)
BUN SERPL-MCNC: 9 MG/DL — SIGNIFICANT CHANGE UP (ref 7–18)
CALCIUM SERPL-MCNC: 10.7 MG/DL — HIGH (ref 8.4–10.5)
CHLORIDE SERPL-SCNC: 113 MMOL/L — HIGH (ref 96–108)
CO2 SERPL-SCNC: 22 MMOL/L — SIGNIFICANT CHANGE UP (ref 22–31)
CREAT SERPL-MCNC: 0.91 MG/DL — SIGNIFICANT CHANGE UP (ref 0.5–1.3)
GLUCOSE SERPL-MCNC: 138 MG/DL — HIGH (ref 70–99)
HCT VFR BLD CALC: 42.2 % — SIGNIFICANT CHANGE UP (ref 34.5–45)
HGB BLD-MCNC: 13.8 G/DL — SIGNIFICANT CHANGE UP (ref 11.5–15.5)
INR BLD: 0.98 RATIO — SIGNIFICANT CHANGE UP (ref 0.88–1.16)
MCHC RBC-ENTMCNC: 28.6 PG — SIGNIFICANT CHANGE UP (ref 27–34)
MCHC RBC-ENTMCNC: 32.7 GM/DL — SIGNIFICANT CHANGE UP (ref 32–36)
MCV RBC AUTO: 87.6 FL — SIGNIFICANT CHANGE UP (ref 80–100)
NRBC # BLD: 0 /100 WBCS — SIGNIFICANT CHANGE UP (ref 0–0)
PLATELET # BLD AUTO: 246 K/UL — SIGNIFICANT CHANGE UP (ref 150–400)
POTASSIUM SERPL-MCNC: 3.5 MMOL/L — SIGNIFICANT CHANGE UP (ref 3.5–5.3)
POTASSIUM SERPL-SCNC: 3.5 MMOL/L — SIGNIFICANT CHANGE UP (ref 3.5–5.3)
PROT SERPL-MCNC: 7 G/DL — SIGNIFICANT CHANGE UP (ref 6–8.3)
PROTHROM AB SERPL-ACNC: 10.9 SEC — SIGNIFICANT CHANGE UP (ref 10–12.9)
RBC # BLD: 4.82 M/UL — SIGNIFICANT CHANGE UP (ref 3.8–5.2)
RBC # FLD: 11.9 % — SIGNIFICANT CHANGE UP (ref 10.3–14.5)
SODIUM SERPL-SCNC: 142 MMOL/L — SIGNIFICANT CHANGE UP (ref 135–145)
WBC # BLD: 5.71 K/UL — SIGNIFICANT CHANGE UP (ref 3.8–10.5)
WBC # FLD AUTO: 5.71 K/UL — SIGNIFICANT CHANGE UP (ref 3.8–10.5)

## 2019-06-24 PROCEDURE — 43247 EGD REMOVE FOREIGN BODY: CPT

## 2019-06-24 PROCEDURE — 99233 SBSQ HOSP IP/OBS HIGH 50: CPT | Mod: GC

## 2019-06-24 PROCEDURE — 43275 ERCP REMOVE FORGN BODY DUCT: CPT

## 2019-06-24 RX ORDER — SODIUM CHLORIDE 9 MG/ML
1000 INJECTION, SOLUTION INTRAVENOUS
Refills: 0 | Status: DISCONTINUED | OUTPATIENT
Start: 2019-06-24 | End: 2019-06-25

## 2019-06-24 RX ORDER — CHLORHEXIDINE GLUCONATE 213 G/1000ML
1 SOLUTION TOPICAL EVERY 12 HOURS
Refills: 0 | Status: COMPLETED | OUTPATIENT
Start: 2019-06-24 | End: 2019-06-25

## 2019-06-24 RX ORDER — CHOLECALCIFEROL (VITAMIN D3) 125 MCG
1000 CAPSULE ORAL DAILY
Refills: 0 | Status: DISCONTINUED | OUTPATIENT
Start: 2019-06-24 | End: 2019-06-27

## 2019-06-24 RX ORDER — HYDROMORPHONE HYDROCHLORIDE 2 MG/ML
0.5 INJECTION INTRAMUSCULAR; INTRAVENOUS; SUBCUTANEOUS
Refills: 0 | Status: DISCONTINUED | OUTPATIENT
Start: 2019-06-24 | End: 2019-06-24

## 2019-06-24 RX ORDER — SODIUM CHLORIDE 9 MG/ML
1000 INJECTION, SOLUTION INTRAVENOUS
Refills: 0 | Status: DISCONTINUED | OUTPATIENT
Start: 2019-06-24 | End: 2019-06-24

## 2019-06-24 RX ADMIN — SODIUM CHLORIDE 100 MILLILITER(S): 9 INJECTION, SOLUTION INTRAVENOUS at 05:33

## 2019-06-24 RX ADMIN — Medication 1000 UNIT(S): at 17:06

## 2019-06-24 RX ADMIN — PIPERACILLIN AND TAZOBACTAM 25 GRAM(S): 4; .5 INJECTION, POWDER, LYOPHILIZED, FOR SOLUTION INTRAVENOUS at 05:32

## 2019-06-24 RX ADMIN — SODIUM CHLORIDE 200 MILLILITER(S): 9 INJECTION, SOLUTION INTRAVENOUS at 17:14

## 2019-06-24 RX ADMIN — PIPERACILLIN AND TAZOBACTAM 25 GRAM(S): 4; .5 INJECTION, POWDER, LYOPHILIZED, FOR SOLUTION INTRAVENOUS at 17:06

## 2019-06-24 RX ADMIN — CHLORHEXIDINE GLUCONATE 1 APPLICATION(S): 213 SOLUTION TOPICAL at 17:04

## 2019-06-24 NOTE — PROGRESS NOTE ADULT - PROBLEM SELECTOR PLAN 1
Admitted with sepsis secondary to obstructive jaundice, sepsis resolved  Afebrile, no leukocytosis   NPO for ERCP with stent removal  Blood cultures negative   GI Dr. Loving   ID Dr. Camara Admitted with sepsis secondary to obstructive jaundice, sepsis resolved  Afebrile, no leukocytosis   NPO for ERCP with stent removal  Blood cultures negative   Continue zosyn   GI Dr. Inder Camara

## 2019-06-24 NOTE — PROGRESS NOTE ADULT - SUBJECTIVE AND OBJECTIVE BOX
Subjective:   No complaints   Objective:    MEDICATIONS  (STANDING):  chlorhexidine 2% Cloths 1 Application(s) Topical every 12 hours  cholecalciferol 1000 Unit(s) Oral daily  dextrose 5% + sodium chloride 0.9%. 1000 milliLiter(s) (100 mL/Hr) IV Continuous <Continuous>  piperacillin/tazobactam IVPB.. 3.375 Gram(s) IV Intermittent every 12 hours  sodium chloride 0.9%. 1000 milliLiter(s) (100 mL/Hr) IV Continuous <Continuous>    MEDICATIONS  (PRN):  ondansetron Injectable 4 milliGRAM(s) IV Push every 8 hours PRN Nausea and/or Vomiting  traMADol 25 milliGRAM(s) Oral every 8 hours PRN Moderate Pain (4 - 6)              Vital Signs Last 24 Hrs  T(C): 36.8 (24 Jun 2019 12:28), Max: 36.8 (24 Jun 2019 05:15)  T(F): 98.3 (24 Jun 2019 12:28), Max: 98.3 (24 Jun 2019 12:28)  HR: 77 (24 Jun 2019 12:28) (77 - 100)  BP: 125/84 (24 Jun 2019 12:28) (106/74 - 125/84)  BP(mean): --  RR: 16 (24 Jun 2019 12:28) (16 - 18)  SpO2: 100% (24 Jun 2019 12:28) (100% - 100%)      General:  Well developed, well nourished, alert and active, no pallor, NAD.  HEENT:    Normal appearance of conjunctiva, ears, nose, lips, oropharynx, and oral mucosa, anicteric.  Neck:  No masses, no asymmetry.  Lymph Nodes:  No lymphadenopathy.   Cardiovascular:  RRR normal S1/S2, no murmur.  Respiratory:  CTA B/L, normal respiratory effort.   Abdominal:   soft, no masses or tenderness, normoactive BS, NT/ND, no HSM.  Extremities:   No clubbing or cyanosis, normal capillary refill, no edema.   Skin:   No rash, jaundice, lesions, eczema.   Musculoskeletal:  No joint swelling, erythema or tenderness.   Neuro: No focal deficits.   Other:       LABS:                        13.8   5.71  )-----------( 246      ( 24 Jun 2019 07:52 )             42.2     06-24    142  |  113<H>  |  9   ----------------------------<  138<H>  3.5   |  22  |  0.91    Ca    10.7<H>      24 Jun 2019 07:52  Phos  3.0     06-23    TPro  7.0  /  Alb  3.0<L>  /  TBili  0.8  /  DBili  x   /  AST  63<H>  /  ALT  240<H>  /  AlkPhos  158<H>  06-24    PT/INR - ( 24 Jun 2019 07:52 )   PT: 10.9 sec;   INR: 0.98 ratio         PTT - ( 24 Jun 2019 07:52 )  PTT:32.0 sec      RADIOLOGY & ADDITIONAL TESTS:

## 2019-06-24 NOTE — PROGRESS NOTE ADULT - SUBJECTIVE AND OBJECTIVE BOX
Patient is seen and examined at the bed side, is afebrile. She is tolerating regular diet well. The WBC count stay normal.        REVIEW OF SYSTEMS: All other review systems are negative        ALLERGIES: No Known Allergies        Vital Signs Last 24 Hrs  T(C): 36.4 (2019 16:56), Max: 37.2 (2019 14:47)  T(F): 97.5 (2019 16:56), Max: 99 (2019 14:47)  HR: 77 (2019 16:56) (67 - 96)  BP: 130/85 (2019 16:56) (111/79 - 136/83)  BP(mean): 99 (2019 15:32) (91 - 102)  RR: 18 (2019 16:56) (13 - 18)  SpO2: 98% (2019 16:56) (96% - 100%)        PHYSICAL EXAM:  GENERAL: Not in distress   CHEST/LUNG:  Air  entry bilaterally  HEART: s1 and s2 present  ABDOMEN:  Nontender and  Nondistended  EXTREMITIES: No pedal  edema  CNS: Awake and Alert        LABS:                        13.8   5.71  )-----------( 246      ( 2019 07:52 )             42.2                        13.4   7.43  )-----------( 194      ( 2019 05:55 )             41.9                           14.1   13.96 )-----------( 167      ( 2019 06:10 )             43.7             142  |  113<H>  |  9   ----------------------------<  138<H>  3.5   |  22  |  0.91    Ca    10.7<H>      2019 07:52  Phos  3.0         TPro  7.0  /  Alb  3.0<L>  /  TBili  0.8  /  DBili  x   /  AST  63<H>  /  ALT  240<H>  /  AlkPhos  158<H>      138  |  106  |  11  ----------------------------<  102<H>  3.5   |  23  |  0.76    Ca    10.8<H>      2019 06:18  Phos  3.0       Mg     2.2         TPro  7.6  /  Alb  3.5  /  TBili  1.4<H>  /  DBili  x   /  AST  88<H>  /  ALT  329<H>  /  AlkPhos  177<H>            Urinalysis Basic - ( 2019 20:39 )  Color: Yellow / Appearance: Clear / S.010 / pH: x  Gluc: x / Ketone: Negative  / Bili: Small / Urobili: 8   Blood: x / Protein: Negative / Nitrite: Negative   Leuk Esterase: Negative / RBC: x / WBC x   Sq Epi: x / Non Sq Epi: x / Bacteria: x      MEDICATIONS  (STANDING):  chlorhexidine 2% Cloths 1 Application(s) Topical every 12 hours  cholecalciferol 1000 Unit(s) Oral daily  lactated ringers. 1000 milliLiter(s) (200 mL/Hr) IV Continuous <Continuous>  piperacillin/tazobactam IVPB.. 3.375 Gram(s) IV Intermittent every 12 hours  sodium chloride 0.9%. 1000 milliLiter(s) (100 mL/Hr) IV Continuous <Continuous>    MEDICATIONS  (PRN):  ondansetron Injectable 4 milliGRAM(s) IV Push every 8 hours PRN Nausea and/or Vomiting  traMADol 25 milliGRAM(s) Oral every 8 hours PRN Moderate Pain (4 - 6)        RADIOLOGY & ADDITIONAL TESTS:    19 : CT Abdomen and Pelvis w/ IV Cont (19 @ 23:50) : No acute intra-abdominal abnormality to explain the  presenting symptoms. Incidental findings including hepatic steatosis, biliary stent in place, breast implants, bilateral spondylolysis of L5.      MICROBIOLOGY DATA:      Culture - Urine (19 @ 00:29)    -  Amikacin: S <=8    -  Amoxicillin/Clavulanic Acid: S <=8/4    -  Ampicillin: S <=2 These ampicillin results predict results for amoxicillin    -  Ampicillin/Sulbactam: S <=4/2 Enterobacter, Citrobacter, and Serratia may develop resistance during prolonged therapy (3-4 days)    -  Aztreonam: S <=4    -  Cefazolin: S <=2 For uncomplicated UTI with K. pneumoniae, E. coli, or P. mirablis: TRACEY <=16 is sensitive and TRACEY >=32 is resistant. This also predicts results for oral agents cefaclor, cefdinir, cefpodoxime, cefprozil, cefuroxime axetil, cephalexin and locarbef for uncomplicated UTI. Note that some isolates may be susceptible to these agents while testing resistant to cefazolin.    -  Cefepime: S <=2    -  Cefoxitin: S <=4    -  Ceftriaxone: S <=1 Enterobacter, Citrobacter, and Serratia may develop resistance during prolonged therapy    -  Ciprofloxacin: S <=0.5    -  Ertapenem: S <=0.5    -  Gentamicin: S <=1    -  Imipenem: S <=1    -  Levofloxacin: S <=1    -  Meropenem: S <=1    -  Nitrofurantoin: S <=32 Should not be used to treat pyelonephritis    -  Piperacillin/Tazobactam: S <=8    -  Tigecycline: S <=1    -  Tobramycin: S <=2    -  Trimethoprim/Sulfamethoxazole: S <=0.5/9.5    Specimen Source: .Urine    Culture Results:   >100,000 CFU/ml Escherichia coli    Organism Identification: Escherichia coli    Organism: Escherichia coli    Method Type: TRACEY Patient is seen and examined at the bed side, is afebrile. She is s/p ERCP with removal of stent, tolerate dthe procedure well. The procedure note not up yet in the system.         REVIEW OF SYSTEMS: All other review systems are negative        ALLERGIES: No Known Allergies        Vital Signs Last 24 Hrs  T(C): 36.4 (2019 16:56), Max: 37.2 (2019 14:47)  T(F): 97.5 (2019 16:56), Max: 99 (2019 14:47)  HR: 77 (2019 16:56) (67 - 96)  BP: 130/85 (2019 16:56) (111/79 - 136/83)  BP(mean): 99 (2019 15:32) (91 - 102)  RR: 18 (2019 16:56) (13 - 18)  SpO2: 98% (2019 16:56) (96% - 100%)        PHYSICAL EXAM:  GENERAL: Not in distress   CHEST/LUNG:  Air  entry bilaterally  HEART: s1 and s2 present  ABDOMEN:  Nontender and  Nondistended  EXTREMITIES: No pedal  edema  CNS: Awake and Alert        LABS:                        13.8   5.71  )-----------( 246      ( 2019 07:52 )             42.2                        13.4   7.43  )-----------( 194      ( 2019 05:55 )             41.9                           14.1   13.96 )-----------( 167      ( 2019 06:10 )             43.7             142  |  113<H>  |  9   ----------------------------<  138<H>  3.5   |  22  |  0.91    Ca    10.7<H>      2019 07:52  Phos  3.0         TPro  7.0  /  Alb  3.0<L>  /  TBili  0.8  /  DBili  x   /  AST  63<H>  /  ALT  240<H>  /  AlkPhos  158<H>      0623    138  |  106  |  11  ----------------------------<  102<H>  3.5   |  23  |  0.76    Ca    10.8<H>      2019 06:18  Phos  3.0       Mg     2.2         TPro  7.6  /  Alb  3.5  /  TBili  1.4<H>  /  DBili  x   /  AST  88<H>  /  ALT  329<H>  /  AlkPhos  177<H>            Urinalysis Basic - ( 2019 20:39 )  Color: Yellow / Appearance: Clear / S.010 / pH: x  Gluc: x / Ketone: Negative  / Bili: Small / Urobili: 8   Blood: x / Protein: Negative / Nitrite: Negative   Leuk Esterase: Negative / RBC: x / WBC x   Sq Epi: x / Non Sq Epi: x / Bacteria: x      MEDICATIONS  (STANDING):  chlorhexidine 2% Cloths 1 Application(s) Topical every 12 hours  cholecalciferol 1000 Unit(s) Oral daily  lactated ringers. 1000 milliLiter(s) (200 mL/Hr) IV Continuous <Continuous>  piperacillin/tazobactam IVPB.. 3.375 Gram(s) IV Intermittent every 12 hours  sodium chloride 0.9%. 1000 milliLiter(s) (100 mL/Hr) IV Continuous <Continuous>    MEDICATIONS  (PRN):  ondansetron Injectable 4 milliGRAM(s) IV Push every 8 hours PRN Nausea and/or Vomiting  traMADol 25 milliGRAM(s) Oral every 8 hours PRN Moderate Pain (4 - 6)        RADIOLOGY & ADDITIONAL TESTS:    19 : CT Abdomen and Pelvis w/ IV Cont (19 @ 23:50) : No acute intra-abdominal abnormality to explain the  presenting symptoms. Incidental findings including hepatic steatosis, biliary stent in place, breast implants, bilateral spondylolysis of L5.      MICROBIOLOGY DATA:      Culture - Urine (19 @ 00:29)    -  Amikacin: S <=8    -  Amoxicillin/Clavulanic Acid: S <=8/4    -  Ampicillin: S <=2 These ampicillin results predict results for amoxicillin    -  Ampicillin/Sulbactam: S <=4/2 Enterobacter, Citrobacter, and Serratia may develop resistance during prolonged therapy (3-4 days)    -  Aztreonam: S <=4    -  Cefazolin: S <=2 For uncomplicated UTI with K. pneumoniae, E. coli, or P. mirablis: TRACEY <=16 is sensitive and TRACEY >=32 is resistant. This also predicts results for oral agents cefaclor, cefdinir, cefpodoxime, cefprozil, cefuroxime axetil, cephalexin and locarbef for uncomplicated UTI. Note that some isolates may be susceptible to these agents while testing resistant to cefazolin.    -  Cefepime: S <=2    -  Cefoxitin: S <=4    -  Ceftriaxone: S <=1 Enterobacter, Citrobacter, and Serratia may develop resistance during prolonged therapy    -  Ciprofloxacin: S <=0.5    -  Ertapenem: S <=0.5    -  Gentamicin: S <=1    -  Imipenem: S <=1    -  Levofloxacin: S <=1    -  Meropenem: S <=1    -  Nitrofurantoin: S <=32 Should not be used to treat pyelonephritis    -  Piperacillin/Tazobactam: S <=8    -  Tigecycline: S <=1    -  Tobramycin: S <=2    -  Trimethoprim/Sulfamethoxazole: S <=0.5/9.5    Specimen Source: .Urine    Culture Results:   >100,000 CFU/ml Escherichia coli    Organism Identification: Escherichia coli    Organism: Escherichia coli    Method Type: TRACEY

## 2019-06-24 NOTE — PROGRESS NOTE ADULT - ASSESSMENT
Patient is a 49y old  Female from home,  with h/o  Pancreatitis, Hyperparathyroidism? , Diagnosed in 2017, was prescribed Alendronate and Furosemide but refused medications and Surgery.  Gallstones s/p Cholecystectomy ( August 2018), Internal hemorrhoids, Breast implants, Abdominoplasty, chronic constipation, overactive bladder ( was on Mirabegron, s/p bladder surgery) presents to the ER for evaluation of  worsening epigastric pain, vomiting x3, numbness and tingling in her Left arm, chills.  She went to her gastroenterologist Dr. Echavarria who referred her in ER. On admission, she found to have  tachycardia, HR:119, Hypotensive,  BP 80/90, and Leukocytosis, WBC:14.07, Serum Ca: 11.7, and elevated LFts. The  CT abdomen and pelvis shows Ventral hernia, hepatic steatosis, Biliary stent in place.     # s/p Septic shock - responded to IVF  # Sepsis ( Tachycardia  + Leukocytosis + cholangitis)  # Cholangitis  # ?? UTI- E.coli    would recommend:    1. NPO after midnight for ERCP  in AM  2. Continue Zosyn until work up is done  3. OOB to chair     d/w     will follow the patient with you Patient is a 49y old  Female from home,  with h/o  Pancreatitis, Hyperparathyroidism? , Diagnosed in 2017, was prescribed Alendronate and Furosemide but refused medications and Surgery.  Gallstones s/p Cholecystectomy ( August 2018), Internal hemorrhoids, Breast implants, Abdominoplasty, chronic constipation, overactive bladder ( was on Mirabegron, s/p bladder surgery) presents to the ER for evaluation of  worsening epigastric pain, vomiting x3, numbness and tingling in her Left arm, chills.  She went to her gastroenterologist Dr. Echavarria who referred her in ER. On admission, she found to have  tachycardia, HR:119, Hypotensive,  BP 80/90, and Leukocytosis, WBC:14.07, Serum Ca: 11.7, and elevated LFts. The  CT abdomen and pelvis shows Ventral hernia, hepatic steatosis, Biliary stent in place.     # s/p Septic shock - responded to IVF  # Sepsis ( Tachycardia  + Leukocytosis + cholangitis)  # Cholangitis  # ?? UTI- E.coli    would recommend:    1. Advanced diet as tolerated   2. Change Zosyn  to oral  Augmentin 875 mg q 12hours until 7/1/19 X 7 more days  3. OOB to chair     d/w Patient and Covering NP    will follow the patient with you

## 2019-06-24 NOTE — PROGRESS NOTE ADULT - ASSESSMENT
49 yr old F from home, ambulates independently, , originally from [place], with PMH of Pancreatitis, Hyperparathyroidism? ( Diagnosed in 2017, was prescribed Alendronate and Furosemide but refused medications and Surgery.  Serum Calcium in 2017: 11.8), Gallstones s/p Cholecystectomy ( August 2018), Internal hemorrhoids, Breast implants, Abdominoplasty, chronic constipation came with complain of abdominal pain x 1 day    Patient is admitted for cholangitis, obstructive jaundice.        Problem/Plan - 3:  ·  Problem: Hypercalcemia. primary hyperparathyroidism.   Pt is not compliant with  prescribed alendronate and furosemide   -c/w IVF hydration  no medical contraindications to planned low risk procedure     Problem/Plan - 4:  ·  Problem: Prophylactic measure.  Plan: OOB ambulates. 49 yr old F from home, ambulates independently, , originally from [place], with PMH of Pancreatitis, Hyperparathyroidism? ( Diagnosed in 2017, was prescribed Alendronate and Furosemide but refused medications and Surgery.  Serum Calcium in 2017: 11.8), Gallstones s/p Cholecystectomy ( August 2018), Internal hemorrhoids, Breast implants, Abdominoplasty, chronic constipation came with complain of abdominal pain x 1 day    Patient is admitted for cholangitis, obstructive jaundice. Plan for ERCP with stent removal on 6/24.

## 2019-06-24 NOTE — PROGRESS NOTE ADULT - SUBJECTIVE AND OBJECTIVE BOX
NP Note discussed with  Primary Attending    Patient is a 49y old  Female who presents with a chief complaint of Abdominal pain (24 Jun 2019 08:11)      INTERVAL HPI/OVERNIGHT EVENTS:  #[id number], no new complaints    MEDICATIONS  (STANDING):  chlorhexidine 2% Cloths 1 Application(s) Topical every 12 hours  cholecalciferol 1000 Unit(s) Oral daily  dextrose 5% + sodium chloride 0.9%. 1000 milliLiter(s) (100 mL/Hr) IV Continuous <Continuous>  piperacillin/tazobactam IVPB.. 3.375 Gram(s) IV Intermittent every 12 hours  sodium chloride 0.9%. 1000 milliLiter(s) (100 mL/Hr) IV Continuous <Continuous>    MEDICATIONS  (PRN):  ondansetron Injectable 4 milliGRAM(s) IV Push every 8 hours PRN Nausea and/or Vomiting  traMADol 25 milliGRAM(s) Oral every 8 hours PRN Moderate Pain (4 - 6)      __________________________________________________  REVIEW OF SYSTEMS:    CONSTITUTIONAL: No fever,   EYES: no acute visual disturbances  NECK: No pain or stiffness  RESPIRATORY: No cough; No shortness of breath  CARDIOVASCULAR: No chest pain, no palpitations  GASTROINTESTINAL: No pain. No nausea or vomiting; No diarrhea   NEUROLOGICAL: No headache or numbness, no tremors  MUSCULOSKELETAL: No joint pain, no muscle pain  GENITOURINARY: no dysuria, no frequency, no hesitancy  PSYCHIATRY: no depression , no anxiety  ALL OTHER  ROS negative        Vital Signs Last 24 Hrs  T(C): 36.8 (24 Jun 2019 12:28), Max: 36.8 (24 Jun 2019 05:15)  T(F): 98.3 (24 Jun 2019 12:28), Max: 98.3 (24 Jun 2019 12:28)  HR: 77 (24 Jun 2019 12:28) (77 - 100)  BP: 125/84 (24 Jun 2019 12:28) (106/74 - 125/84)  BP(mean): --  RR: 16 (24 Jun 2019 12:28) (16 - 18)  SpO2: 100% (24 Jun 2019 12:28) (100% - 100%)    ________________________________________________  PHYSICAL EXAM:  GENERAL: NAD  HEENT: Normocephalic;  conjunctivae and sclerae clear; moist mucous membranes;   NECK : supple  CHEST/LUNG: Clear to auscultation bilaterally with good air entry   HEART: S1 S2  regular; no murmurs, gallops or rubs  ABDOMEN: Soft, Nontender, Nondistended; Bowel sounds present  EXTREMITIES: no cyanosis; no edema; no calf tenderness  SKIN: warm and dry; no rash  NERVOUS SYSTEM:  Awake and alert; Oriented  to place, person and time ; no new deficits    _________________________________________________  LABS:                        13.8   5.71  )-----------( 246      ( 24 Jun 2019 07:52 )             42.2     06-24    142  |  113<H>  |  9   ----------------------------<  138<H>  3.5   |  22  |  0.91    Ca    10.7<H>      24 Jun 2019 07:52  Phos  3.0     06-23    TPro  7.0  /  Alb  3.0<L>  /  TBili  0.8  /  DBili  x   /  AST  63<H>  /  ALT  240<H>  /  AlkPhos  158<H>  06-24    PT/INR - ( 24 Jun 2019 07:52 )   PT: 10.9 sec;   INR: 0.98 ratio         PTT - ( 24 Jun 2019 07:52 )  PTT:32.0 sec    CAPILLARY BLOOD GLUCOSE      RADIOLOGY & ADDITIONAL TESTS:      < from: CT Abdomen and Pelvis w/ IV Cont (06.20.19 @ 23:50) >  IMPRESSION: No acute intra-abdominal abnormality to explain the   presenting symptoms.    Incidental findings including hepatic steatosis, biliary stent in place,   breast implants, bilateral spondylolysis of L5.    < end of copied text >  Imaging  Reviewed:  YES    Consultant(s) Notes Reviewed:   YES      Plan of care was discussed with patient and /or primary care giver; all questions and concerns were addressed

## 2019-06-25 ENCOUNTER — TRANSCRIPTION ENCOUNTER (OUTPATIENT)
Age: 49
End: 2019-06-25

## 2019-06-25 DIAGNOSIS — Z86.39 PERSONAL HISTORY OF OTHER ENDOCRINE, NUTRITIONAL AND METABOLIC DISEASE: ICD-10-CM

## 2019-06-25 LAB
ALBUMIN SERPL ELPH-MCNC: 3.3 G/DL — LOW (ref 3.5–5)
ALBUMIN SERPL ELPH-MCNC: 3.6 G/DL — SIGNIFICANT CHANGE UP (ref 3.5–5)
ALP SERPL-CCNC: 159 U/L — HIGH (ref 40–120)
ALP SERPL-CCNC: 271 U/L — HIGH (ref 40–120)
ALT FLD-CCNC: 224 U/L DA — HIGH (ref 10–60)
ALT FLD-CCNC: 388 U/L DA — HIGH (ref 10–60)
ANION GAP SERPL CALC-SCNC: 7 MMOL/L — SIGNIFICANT CHANGE UP (ref 5–17)
ANION GAP SERPL CALC-SCNC: 7 MMOL/L — SIGNIFICANT CHANGE UP (ref 5–17)
AST SERPL-CCNC: 258 U/L — HIGH (ref 10–40)
AST SERPL-CCNC: 67 U/L — HIGH (ref 10–40)
BASOPHILS # BLD AUTO: 0.04 K/UL — SIGNIFICANT CHANGE UP (ref 0–0.2)
BASOPHILS NFR BLD AUTO: 0.5 % — SIGNIFICANT CHANGE UP (ref 0–2)
BILIRUB SERPL-MCNC: 0.7 MG/DL — SIGNIFICANT CHANGE UP (ref 0.2–1.2)
BILIRUB SERPL-MCNC: 2 MG/DL — HIGH (ref 0.2–1.2)
BUN SERPL-MCNC: 6 MG/DL — LOW (ref 7–18)
BUN SERPL-MCNC: 9 MG/DL — SIGNIFICANT CHANGE UP (ref 7–18)
CALCIUM SERPL-MCNC: 12 MG/DL — HIGH (ref 8.4–10.5)
CALCIUM SERPL-MCNC: 12.2 MG/DL — HIGH (ref 8.4–10.5)
CHLORIDE SERPL-SCNC: 106 MMOL/L — SIGNIFICANT CHANGE UP (ref 96–108)
CHLORIDE SERPL-SCNC: 108 MMOL/L — SIGNIFICANT CHANGE UP (ref 96–108)
CO2 SERPL-SCNC: 25 MMOL/L — SIGNIFICANT CHANGE UP (ref 22–31)
CO2 SERPL-SCNC: 27 MMOL/L — SIGNIFICANT CHANGE UP (ref 22–31)
CREAT SERPL-MCNC: 0.72 MG/DL — SIGNIFICANT CHANGE UP (ref 0.5–1.3)
CREAT SERPL-MCNC: 0.98 MG/DL — SIGNIFICANT CHANGE UP (ref 0.5–1.3)
EOSINOPHIL # BLD AUTO: 0.06 K/UL — SIGNIFICANT CHANGE UP (ref 0–0.5)
EOSINOPHIL NFR BLD AUTO: 0.7 % — SIGNIFICANT CHANGE UP (ref 0–6)
GLUCOSE SERPL-MCNC: 139 MG/DL — HIGH (ref 70–99)
GLUCOSE SERPL-MCNC: 89 MG/DL — SIGNIFICANT CHANGE UP (ref 70–99)
HCT VFR BLD CALC: 42 % — SIGNIFICANT CHANGE UP (ref 34.5–45)
HCT VFR BLD CALC: 45.8 % — HIGH (ref 34.5–45)
HGB BLD-MCNC: 13.9 G/DL — SIGNIFICANT CHANGE UP (ref 11.5–15.5)
HGB BLD-MCNC: 15 G/DL — SIGNIFICANT CHANGE UP (ref 11.5–15.5)
IMM GRANULOCYTES NFR BLD AUTO: 0.3 % — SIGNIFICANT CHANGE UP (ref 0–1.5)
LACTATE SERPL-SCNC: 1.7 MMOL/L — SIGNIFICANT CHANGE UP (ref 0.7–2)
LYMPHOCYTES # BLD AUTO: 0.92 K/UL — LOW (ref 1–3.3)
LYMPHOCYTES # BLD AUTO: 10.6 % — LOW (ref 13–44)
MCHC RBC-ENTMCNC: 28.4 PG — SIGNIFICANT CHANGE UP (ref 27–34)
MCHC RBC-ENTMCNC: 29 PG — SIGNIFICANT CHANGE UP (ref 27–34)
MCHC RBC-ENTMCNC: 32.8 GM/DL — SIGNIFICANT CHANGE UP (ref 32–36)
MCHC RBC-ENTMCNC: 33.1 GM/DL — SIGNIFICANT CHANGE UP (ref 32–36)
MCV RBC AUTO: 86.7 FL — SIGNIFICANT CHANGE UP (ref 80–100)
MCV RBC AUTO: 87.5 FL — SIGNIFICANT CHANGE UP (ref 80–100)
MONOCYTES # BLD AUTO: 0.81 K/UL — SIGNIFICANT CHANGE UP (ref 0–0.9)
MONOCYTES NFR BLD AUTO: 9.4 % — SIGNIFICANT CHANGE UP (ref 2–14)
NEUTROPHILS # BLD AUTO: 6.79 K/UL — SIGNIFICANT CHANGE UP (ref 1.8–7.4)
NEUTROPHILS NFR BLD AUTO: 78.5 % — HIGH (ref 43–77)
NRBC # BLD: 0 /100 WBCS — SIGNIFICANT CHANGE UP (ref 0–0)
NRBC # BLD: 0 /100 WBCS — SIGNIFICANT CHANGE UP (ref 0–0)
PLATELET # BLD AUTO: 261 K/UL — SIGNIFICANT CHANGE UP (ref 150–400)
PLATELET # BLD AUTO: 279 K/UL — SIGNIFICANT CHANGE UP (ref 150–400)
POTASSIUM SERPL-MCNC: 3.4 MMOL/L — LOW (ref 3.5–5.3)
POTASSIUM SERPL-MCNC: 3.7 MMOL/L — SIGNIFICANT CHANGE UP (ref 3.5–5.3)
POTASSIUM SERPL-SCNC: 3.4 MMOL/L — LOW (ref 3.5–5.3)
POTASSIUM SERPL-SCNC: 3.7 MMOL/L — SIGNIFICANT CHANGE UP (ref 3.5–5.3)
PROT SERPL-MCNC: 7.3 G/DL — SIGNIFICANT CHANGE UP (ref 6–8.3)
PROT SERPL-MCNC: 7.8 G/DL — SIGNIFICANT CHANGE UP (ref 6–8.3)
RBC # BLD: 4.8 M/UL — SIGNIFICANT CHANGE UP (ref 3.8–5.2)
RBC # BLD: 5.28 M/UL — HIGH (ref 3.8–5.2)
RBC # FLD: 11.9 % — SIGNIFICANT CHANGE UP (ref 10.3–14.5)
RBC # FLD: 11.9 % — SIGNIFICANT CHANGE UP (ref 10.3–14.5)
SODIUM SERPL-SCNC: 138 MMOL/L — SIGNIFICANT CHANGE UP (ref 135–145)
SODIUM SERPL-SCNC: 142 MMOL/L — SIGNIFICANT CHANGE UP (ref 135–145)
WBC # BLD: 8.18 K/UL — SIGNIFICANT CHANGE UP (ref 3.8–10.5)
WBC # BLD: 8.65 K/UL — SIGNIFICANT CHANGE UP (ref 3.8–10.5)
WBC # FLD AUTO: 8.18 K/UL — SIGNIFICANT CHANGE UP (ref 3.8–10.5)
WBC # FLD AUTO: 8.65 K/UL — SIGNIFICANT CHANGE UP (ref 3.8–10.5)

## 2019-06-25 PROCEDURE — 71045 X-RAY EXAM CHEST 1 VIEW: CPT | Mod: 26

## 2019-06-25 PROCEDURE — 99233 SBSQ HOSP IP/OBS HIGH 50: CPT | Mod: GC

## 2019-06-25 RX ORDER — SODIUM CHLORIDE 9 MG/ML
1000 INJECTION, SOLUTION INTRAVENOUS
Refills: 0 | Status: DISCONTINUED | OUTPATIENT
Start: 2019-06-25 | End: 2019-06-26

## 2019-06-25 RX ORDER — POTASSIUM CHLORIDE 20 MEQ
40 PACKET (EA) ORAL ONCE
Refills: 0 | Status: COMPLETED | OUTPATIENT
Start: 2019-06-25 | End: 2019-06-25

## 2019-06-25 RX ORDER — KETOROLAC TROMETHAMINE 30 MG/ML
10 SYRINGE (ML) INJECTION EVERY 6 HOURS
Refills: 0 | Status: DISCONTINUED | OUTPATIENT
Start: 2019-06-25 | End: 2019-06-27

## 2019-06-25 RX ORDER — CHOLECALCIFEROL (VITAMIN D3) 125 MCG
1000 CAPSULE ORAL
Qty: 30 | Refills: 0
Start: 2019-06-25 | End: 2019-07-24

## 2019-06-25 RX ADMIN — TRAMADOL HYDROCHLORIDE 25 MILLIGRAM(S): 50 TABLET ORAL at 16:15

## 2019-06-25 RX ADMIN — ONDANSETRON 4 MILLIGRAM(S): 8 TABLET, FILM COATED ORAL at 15:42

## 2019-06-25 RX ADMIN — Medication 40 MILLIEQUIVALENT(S): at 18:49

## 2019-06-25 RX ADMIN — TRAMADOL HYDROCHLORIDE 25 MILLIGRAM(S): 50 TABLET ORAL at 15:42

## 2019-06-25 RX ADMIN — PIPERACILLIN AND TAZOBACTAM 25 GRAM(S): 4; .5 INJECTION, POWDER, LYOPHILIZED, FOR SOLUTION INTRAVENOUS at 17:57

## 2019-06-25 RX ADMIN — SODIUM CHLORIDE 100 MILLILITER(S): 9 INJECTION, SOLUTION INTRAVENOUS at 15:44

## 2019-06-25 RX ADMIN — CHLORHEXIDINE GLUCONATE 1 APPLICATION(S): 213 SOLUTION TOPICAL at 05:33

## 2019-06-25 RX ADMIN — PIPERACILLIN AND TAZOBACTAM 25 GRAM(S): 4; .5 INJECTION, POWDER, LYOPHILIZED, FOR SOLUTION INTRAVENOUS at 05:30

## 2019-06-25 RX ADMIN — Medication 1000 UNIT(S): at 12:48

## 2019-06-25 NOTE — DISCHARGE NOTE PROVIDER - CARE PROVIDER_API CALL
Meghna Bay)  EndocrinologyMetabDiabetes  [address] [place] [address]  Phone: [phone]  Fax: [phone]  Follow Up Time:     Taj Echavarria)  Gastroenterology; Internal Medicine  [address] [place] [address]  Phone: [phone]  Fax: [phone]  Follow Up Time: Meghna Bay)  [facility]abetes  [address] [place] [address]  Phone: [phone]  Fax: [phone]  Follow Up Time: 1-3 days    Taj Echavarria)  Gastroenterology; Internal Medicine  [address] [place] [address]  Phone: [phone]  Fax: [phone]  Follow Up Time: 1-3 days Meghna Bay)  [facility]abetes  [address] [place] [address]  Phone: [phone]  Fax: [phone]  Follow Up Time: 1-3 days    Taj Echavarria)  Gastroenterology; Internal Medicine  [address] [place] [address]  Phone: [phone]  Fax: [phone]  Follow Up Time: 1-3 days    Rodolfo Weeks)  Internal Medicine  [address] [place] [address]  Phone: [phone]  Fax: [phone]  Follow Up Time: 1 week

## 2019-06-25 NOTE — PROGRESS NOTE ADULT - ASSESSMENT
Patient is a 49y old  Female from home,  with h/o  Pancreatitis, Hyperparathyroidism? , Diagnosed in 2017, was prescribed Alendronate and Furosemide but refused medications and Surgery.  Gallstones s/p Cholecystectomy ( August 2018), Internal hemorrhoids, Breast implants, Abdominoplasty, chronic constipation, overactive bladder ( was on Mirabegron, s/p bladder surgery) presents to the ER for evaluation of  worsening epigastric pain, vomiting x3, numbness and tingling in her Left arm, chills.  She went to her gastroenterologist Dr. Echavarria who referred her in ER. On admission, she found to have  tachycardia, HR:119, Hypotensive,  BP 80/90, and Leukocytosis, WBC:14.07, Serum Ca: 11.7, and elevated LFts. The  CT abdomen and pelvis shows Ventral hernia, hepatic steatosis, Biliary stent in place.     # s/p Septic shock - responded to IVF  # Sepsis ( Tachycardia  + Leukocytosis + cholangitis)  # Cholangitis  # ?? UTI- E.coli    would recommend:    1. Obtain cultures for fever work up  2. Abdominal Us since Lfts and bilirubin is trending up  3. Change Zosyn  until work up is done  4. OOB to chair     d/w Patient and Covering NP, Miguel     will follow the patient with you Patient is a 49y old  Female from home,  with h/o  Pancreatitis, Hyperparathyroidism? , Diagnosed in 2017, was prescribed Alendronate and Furosemide but refused medications and Surgery.  Gallstones s/p Cholecystectomy ( August 2018), Internal hemorrhoids, Breast implants, Abdominoplasty, chronic constipation, overactive bladder ( was on Mirabegron, s/p bladder surgery) presents to the ER for evaluation of  worsening epigastric pain, vomiting x3, numbness and tingling in her Left arm, chills.  She went to her gastroenterologist Dr. Echavarria who referred her in ER. On admission, she found to have  tachycardia, HR:119, Hypotensive,  BP 80/90, and Leukocytosis, WBC:14.07, Serum Ca: 11.7, and elevated LFts. The  CT abdomen and pelvis shows Ventral hernia, hepatic steatosis, Biliary stent in place.     # s/p Septic shock - responded to IVF  # Sepsis ( Tachycardia  + Leukocytosis + cholangitis)  # Cholangitis -  She is s/p ERCP with removal of stent, tolerate dthe procedure well 6/25/19  # ?? UTI- E.coli    would recommend:    1. Obtain cultures for fever work up  2. Abdominal Us since Lfts and bilirubin is trending up  3. Change Zosyn  until work up is done  4. OOB to chair     d/w Patient, Covering NP, Miguel and Dr. Sneed    will follow the patient with you

## 2019-06-25 NOTE — DIETITIAN INITIAL EVALUATION ADULT. - OTHER INFO
Pt visited. Pt is Divehi speaking but able to make her needs known in English. Pt C/O Nausea after lunch. Observed post Lunch meal pt ate very little few bites. Pt got  nausea and  abdomen pain. so diet was changed to clear .S/P ERCP with stent removal yesterday,  Pt  Reports Appetite good PTA. Weight stable. D/W NP suggested Ensure clear.

## 2019-06-25 NOTE — DIETITIAN INITIAL EVALUATION ADULT. - PROBLEM SELECTOR PLAN 1
p/w epigastric abdominal pain, radiation to RUQ could be due to biliary stent/ H pylori induced peptic ulcer?   Will also rule out intrabdominal infection due to Biliary stent as patient was leukocytosis, hypotensive, tachycardiac but does not look toxic  F/u Blood cultures  c/w IV hydration, pain management  Avoid Tyelnol  ID consult Dr. Edmond  Gastro Dr. [place]

## 2019-06-25 NOTE — PROGRESS NOTE ADULT - SUBJECTIVE AND OBJECTIVE BOX
Patient is seen and examined at the bed side, is afebrile. She is s/p ERCP with removal of stent, tolerate dthe procedure well. The procedure note not up yet in the system.         REVIEW OF SYSTEMS: All other review systems are negative        ALLERGIES: No Known Allergies        Vital Signs Last 24 Hrs  T(C): 38.1 (2019 15:17), Max: 38.1 (2019 15:17)  T(F): 100.6 (2019 15:17), Max: 100.6 (2019 15:17)  HR: 119 (2019 15:17) (68 - 119)  BP: 149/63 (2019 15:17) (103/66 - 149/63)  BP(mean): --  RR: 16 (2019 15:17) (16 - 16)  SpO2: 100% (2019 15:17) (98% - 100%)      PHYSICAL EXAM:  GENERAL: Not in distress   CHEST/LUNG:  Air  entry bilaterally  HEART: s1 and s2 present  ABDOMEN:  Nontender and  Nondistended  EXTREMITIES: No pedal  edema  CNS: Awake and Alert        LABS:                        15.0   8.65  )-----------( 261      ( 2019 16:13 )             45.8                           13.4   7.43  )-----------( 194      ( 2019 05:55 )             41.9                           14.1   13.96 )-----------( 167      ( 2019 06:10 )             43.7           138  |  106  |  9   ----------------------------<  139<H>  3.4<L>   |  25  |  0.98    Ca    12.2<H>      2019 16:13    TPro  7.8  /  Alb  3.6  /  TBili  2.0<H>  /  DBili  x   /  AST  258<H>  /  ALT  388<H>  /  AlkPhos  271<H>      142  |  113<H>  |  9   ----------------------------<  138<H>  3.5   |  22  |  0.91    Ca    10.7<H>      2019 07:52  Phos  3.0         TPro  7.0  /  Alb  3.0<L>  /  TBili  0.8  /  DBili  x   /  AST  63<H>  /  ALT  240<H>  /  AlkPhos  158<H>            Urinalysis Basic - ( 2019 20:39 )  Color: Yellow / Appearance: Clear / S.010 / pH: x  Gluc: x / Ketone: Negative  / Bili: Small / Urobili: 8   Blood: x / Protein: Negative / Nitrite: Negative   Leuk Esterase: Negative / RBC: x / WBC x   Sq Epi: x / Non Sq Epi: x / Bacteria: x      MEDICATIONS  (STANDING):  cholecalciferol 1000 Unit(s) Oral daily  lactated ringers. 1000 milliLiter(s) (100 mL/Hr) IV Continuous <Continuous>  piperacillin/tazobactam IVPB.. 3.375 Gram(s) IV Intermittent every 12 hours  potassium chloride    Tablet ER 40 milliEquivalent(s) Oral once  sodium chloride 0.9%. 1000 milliLiter(s) (100 mL/Hr) IV Continuous <Continuous>    MEDICATIONS  (PRN):  ketorolac 10 milliGRAM(s) Oral every 6 hours PRN Moderate Pain (4 - 6)  ondansetron Injectable 4 milliGRAM(s) IV Push every 8 hours PRN Nausea and/or Vomiting  traMADol 25 milliGRAM(s) Oral every 8 hours PRN Moderate Pain (4 - 6)        RADIOLOGY & ADDITIONAL TESTS:      19 : CT Abdomen and Pelvis w/ IV Cont (19 @ 23:50) : No acute intra-abdominal abnormality to explain the  presenting symptoms. Incidental findings including hepatic steatosis, biliary stent in place, breast implants, bilateral spondylolysis of L5.      MICROBIOLOGY DATA:      Culture - Urine (19 @ 00:29)    -  Amikacin: S <=8    -  Amoxicillin/Clavulanic Acid: S <=8/4    -  Ampicillin: S <=2 These ampicillin results predict results for amoxicillin    -  Ampicillin/Sulbactam: S <=4/2 Enterobacter, Citrobacter, and Serratia may develop resistance during prolonged therapy (3-4 days)    -  Aztreonam: S <=4    -  Cefazolin: S <=2 For uncomplicated UTI with K. pneumoniae, E. coli, or P. mirablis: TRACEY <=16 is sensitive and TRACEY >=32 is resistant. This also predicts results for oral agents cefaclor, cefdinir, cefpodoxime, cefprozil, cefuroxime axetil, cephalexin and locarbef for uncomplicated UTI. Note that some isolates may be susceptible to these agents while testing resistant to cefazolin.    -  Cefepime: S <=2    -  Cefoxitin: S <=4    -  Ceftriaxone: S <=1 Enterobacter, Citrobacter, and Serratia may develop resistance during prolonged therapy    -  Ciprofloxacin: S <=0.5    -  Ertapenem: S <=0.5    -  Gentamicin: S <=1    -  Imipenem: S <=1    -  Levofloxacin: S <=1    -  Meropenem: S <=1    -  Nitrofurantoin: S <=32 Should not be used to treat pyelonephritis    -  Piperacillin/Tazobactam: S <=8    -  Tigecycline: S <=1    -  Tobramycin: S <=2    -  Trimethoprim/Sulfamethoxazole: S <=0.5/9.5    Specimen Source: .Urine    Culture Results:   >100,000 CFU/ml Escherichia coli    Organism Identification: Escherichia coli    Organism: Escherichia coli    Method Type: TRACEY Patient is seen and examined at the bed side, spiked fever today. The procedure note not up yet in the system.         REVIEW OF SYSTEMS: All other review systems are negative        ALLERGIES: No Known Allergies        Vital Signs Last 24 Hrs  T(C): 38.1 (2019 15:17), Max: 38.1 (2019 15:17)  T(F): 100.6 (2019 15:17), Max: 100.6 (2019 15:17)  HR: 119 (2019 15:17) (68 - 119)  BP: 149/63 (2019 15:17) (103/66 - 149/63)  BP(mean): --  RR: 16 (2019 15:17) (16 - 16)  SpO2: 100% (2019 15:17) (98% - 100%)      PHYSICAL EXAM:  GENERAL: Not in distress   CHEST/LUNG:  Air  entry bilaterally  HEART: s1 and s2 present  ABDOMEN:  Nontender and  Nondistended  EXTREMITIES: No pedal  edema  CNS: Awake and Alert        LABS:                        15.0   8.65  )-----------( 261      ( 2019 16:13 )             45.8                           13.4   7.43  )-----------( 194      ( 2019 05:55 )             41.9                           14.1   13.96 )-----------( 167      ( 2019 06:10 )             43.7           138  |  106  |  9   ----------------------------<  139<H>  3.4<L>   |  25  |  0.98    Ca    12.2<H>      2019 16:13    TPro  7.8  /  Alb  3.6  /  TBili  2.0<H>  /  DBili  x   /  AST  258<H>  /  ALT  388<H>  /  AlkPhos  271<H>      142  |  113<H>  |  9   ----------------------------<  138<H>  3.5   |  22  |  0.91    Ca    10.7<H>      2019 07:52  Phos  3.0         TPro  7.0  /  Alb  3.0<L>  /  TBili  0.8  /  DBili  x   /  AST  63<H>  /  ALT  240<H>  /  AlkPhos  158<H>            Urinalysis Basic - ( 2019 20:39 )  Color: Yellow / Appearance: Clear / S.010 / pH: x  Gluc: x / Ketone: Negative  / Bili: Small / Urobili: 8   Blood: x / Protein: Negative / Nitrite: Negative   Leuk Esterase: Negative / RBC: x / WBC x   Sq Epi: x / Non Sq Epi: x / Bacteria: x      MEDICATIONS  (STANDING):  cholecalciferol 1000 Unit(s) Oral daily  lactated ringers. 1000 milliLiter(s) (100 mL/Hr) IV Continuous <Continuous>  piperacillin/tazobactam IVPB.. 3.375 Gram(s) IV Intermittent every 12 hours  potassium chloride    Tablet ER 40 milliEquivalent(s) Oral once  sodium chloride 0.9%. 1000 milliLiter(s) (100 mL/Hr) IV Continuous <Continuous>    MEDICATIONS  (PRN):  ketorolac 10 milliGRAM(s) Oral every 6 hours PRN Moderate Pain (4 - 6)  ondansetron Injectable 4 milliGRAM(s) IV Push every 8 hours PRN Nausea and/or Vomiting  traMADol 25 milliGRAM(s) Oral every 8 hours PRN Moderate Pain (4 - 6)        RADIOLOGY & ADDITIONAL TESTS:      19 : CT Abdomen and Pelvis w/ IV Cont (19 @ 23:50) : No acute intra-abdominal abnormality to explain the  presenting symptoms. Incidental findings including hepatic steatosis, biliary stent in place, breast implants, bilateral spondylolysis of L5.      MICROBIOLOGY DATA:      Culture - Urine (19 @ 00:29)    -  Amikacin: S <=8    -  Amoxicillin/Clavulanic Acid: S <=8/4    -  Ampicillin: S <=2 These ampicillin results predict results for amoxicillin    -  Ampicillin/Sulbactam: S <=4/2 Enterobacter, Citrobacter, and Serratia may develop resistance during prolonged therapy (3-4 days)    -  Aztreonam: S <=4    -  Cefazolin: S <=2 For uncomplicated UTI with K. pneumoniae, E. coli, or P. mirablis: TRACEY <=16 is sensitive and TRACEY >=32 is resistant. This also predicts results for oral agents cefaclor, cefdinir, cefpodoxime, cefprozil, cefuroxime axetil, cephalexin and locarbef for uncomplicated UTI. Note that some isolates may be susceptible to these agents while testing resistant to cefazolin.    -  Cefepime: S <=2    -  Cefoxitin: S <=4    -  Ceftriaxone: S <=1 Enterobacter, Citrobacter, and Serratia may develop resistance during prolonged therapy    -  Ciprofloxacin: S <=0.5    -  Ertapenem: S <=0.5    -  Gentamicin: S <=1    -  Imipenem: S <=1    -  Levofloxacin: S <=1    -  Meropenem: S <=1    -  Nitrofurantoin: S <=32 Should not be used to treat pyelonephritis    -  Piperacillin/Tazobactam: S <=8    -  Tigecycline: S <=1    -  Tobramycin: S <=2    -  Trimethoprim/Sulfamethoxazole: S <=0.5/9.5    Specimen Source: .Urine    Culture Results:   >100,000 CFU/ml Escherichia coli    Organism Identification: Escherichia coli    Organism: Escherichia coli    Method Type: TRACEY

## 2019-06-25 NOTE — PROGRESS NOTE ADULT - SUBJECTIVE AND OBJECTIVE BOX
NP Note discussed with  Primary Attending    Patient is a 49y old  Female who presents with a chief complaint of Abdominal pain (24 Jun 2019 18:17)      INTERVAL HPI/OVERNIGHT EVENTS: no new complaints; reports feeling better; tolerating regular diet    MEDICATIONS  (STANDING):  cholecalciferol 1000 Unit(s) Oral daily  lactated ringers. 1000 milliLiter(s) (200 mL/Hr) IV Continuous <Continuous>  piperacillin/tazobactam IVPB.. 3.375 Gram(s) IV Intermittent every 12 hours  sodium chloride 0.9%. 1000 milliLiter(s) (100 mL/Hr) IV Continuous <Continuous>    MEDICATIONS  (PRN):  ondansetron Injectable 4 milliGRAM(s) IV Push every 8 hours PRN Nausea and/or Vomiting  traMADol 25 milliGRAM(s) Oral every 8 hours PRN Moderate Pain (4 - 6)      __________________________________________________  REVIEW OF SYSTEMS:    CONSTITUTIONAL: No fever,   EYES: no acute visual disturbances  NECK: No pain or stiffness  RESPIRATORY: No cough; No shortness of breath  CARDIOVASCULAR: No chest pain, no palpitations  GASTROINTESTINAL: No pain. No nausea or vomiting; No diarrhea   NEUROLOGICAL: No headache or numbness, no tremors  MUSCULOSKELETAL: No joint pain, no muscle pain  GENITOURINARY: no dysuria, no frequency, no hesitancy  PSYCHIATRY: no depression , no anxiety  ALL OTHER  ROS negative        Vital Signs Last 24 Hrs  T(C): 36.7 (25 Jun 2019 12:41), Max: 37.2 (24 Jun 2019 14:47)  T(F): 98.1 (25 Jun 2019 12:41), Max: 99 (24 Jun 2019 14:47)  HR: 102 (25 Jun 2019 12:41) (67 - 102)  BP: 131/86 (25 Jun 2019 12:41) (103/66 - 136/83)  BP(mean): 99 (24 Jun 2019 15:32) (91 - 102)  RR: 16 (25 Jun 2019 12:41) (13 - 18)  SpO2: 100% (25 Jun 2019 12:41) (96% - 100%)    ________________________________________________  PHYSICAL EXAM:  GENERAL: NAD  HEENT: Normocephalic;  conjunctivae and sclerae clear; moist mucous membranes;   NECK : supple  CHEST/LUNG: Clear to auscultation bilaterally with good air entry   HEART: S1 S2  regular; no murmurs, gallops or rubs  ABDOMEN: Soft, Nontender, Nondistended; Bowel sounds present  EXTREMITIES: no cyanosis; no edema; no calf tenderness  SKIN: warm and dry; no rash  NERVOUS SYSTEM:  Awake and alert; Oriented  to place, person and time ; no new deficits    _________________________________________________  LABS:                        13.9   8.18  )-----------( 279      ( 25 Jun 2019 06:01 )             42.0     06-25    142  |  108  |  6<L>  ----------------------------<  89  3.7   |  27  |  0.72    Ca    12.0<H>      25 Jun 2019 06:01    TPro  7.3  /  Alb  3.3<L>  /  TBili  0.7  /  DBili  x   /  AST  67<H>  /  ALT  224<H>  /  AlkPhos  159<H>  06-25    PT/INR - ( 24 Jun 2019 07:52 )   PT: 10.9 sec;   INR: 0.98 ratio         PTT - ( 24 Jun 2019 07:52 )  PTT:32.0 sec    CAPILLARY BLOOD GLUCOSE            Plan of care was discussed with patient and /or primary care giver; all questions and concerns were addressed and care was aligned with patient's wishes.

## 2019-06-25 NOTE — DISCHARGE NOTE PROVIDER - HOSPITAL COURSE
49 yr old F from home, ambulates independently, , originally from [place], with PMH of Pancreatitis, Hyperparathyroidism? ( Diagnosed in 2017, was prescribed Alendronate and Furosemide but refused medications and Surgery.  Serum Calcium in 2017: 11.8), Gallstones s/p Cholecystectomy ( August 2018), Internal hemorrhoids, Breast implants, Abdominoplasty, chronic constipation, overactive bladder ( was on Mirabegron, s/p bladder surgery) came with complain of abdominal pain x 1 day.  In ED, patient's vital signs were remarkable for HR:119, Lowest BP 80/90, EKG shows sinus tachycardia. Labs were remarkable for WBC:14.07, Serum Ca: 11.7, Total Protein 8.4, ALK PO: 212, AST:888, ALT: 735, Bilirubin 1.8, CT abdomen and pelvis shows Ventral hernia, hepatic steatosis, Biliary stent in place. Patient admitted with sepsis secondary to obstructive jaundice secondary to retained biliary stent. Patient is now s/p ERCP with stent removing and doing and feeling better. No abdominal pain, Nausea or vomiting. Tolerating regular diet. 49 yr old F from home, ambulates independently, , originally from [place], with PMH of Pancreatitis, Hyperparathyroidism? ( Diagnosed in 2017, was prescribed Alendronate and Furosemide but refused medications and Surgery.  Serum Calcium in 2017: 11.8), Gallstones s/p Cholecystectomy ( August 2018), Internal hemorrhoids, Breast implants, Abdominoplasty, chronic constipation, overactive bladder ( was on Mirabegron, s/p bladder surgery) came with complain of abdominal pain x 1 day.  In ED, patient's vital signs were remarkable for HR:119, Lowest BP 80/90, EKG shows sinus tachycardia. Labs were remarkable for WBC:14.07, Serum Ca: 11.7, Total Protein 8.4, ALK PO: 212, AST:888, ALT: 735, Bilirubin 1.8, CT abdomen and pelvis shows Ventral hernia, hepatic steatosis, Biliary stent in place. Patient admitted with sepsis secondary to obstructive jaundice secondary to retained biliary stent. Patient is now s/p ERCP with stent removing and doing and feeling better. No abdominal pain, Nausea or vomiting. Tolerating regular diet.        6/25 Pt spiked temp; given fluids, torodol, zofran and CXR.  Patient has now been afebrile for over 24H, tolerating regular diet, ambulating frequently and without assistance.  Patient medically stable for discharge.

## 2019-06-25 NOTE — DISCHARGE NOTE PROVIDER - NSDCCPCAREPLAN_GEN_ALL_CORE_FT
PRINCIPAL DISCHARGE DIAGNOSIS  Diagnosis: Sepsis  Assessment and Plan of Treatment: You met sepsis parameters on admission but now you have clinically improved  Take all antibiotics as ordered.  Call you Health care provider upon arrival home to make a one week follow up appointment.  If you develop fever, chills, malaise, or change in mental status call your Health Care Provider or go to the Emergency Department.  Nutrition is important, eat small frequent meals to help ensure you get adequate calories.  Do not stay in bed all day!  Increase your activity daily as tolerated.      SECONDARY DISCHARGE DIAGNOSES  Diagnosis: Cholangitis  Assessment and Plan of Treatment: Afebrile, no leukocytosis   s/p ERCP yesterday with stent removal  Blood cultures are  negative   You will be given a prescription for Augmentin which you will take until 7/1/19.        Diagnosis: Obstructive jaundice  Assessment and Plan of Treatment: Secondary to retained biliary stent which was removed during GI proceedure yesterday.  Complete antibiotics as prescribed    Diagnosis: History of hyperparathyroidism  Assessment and Plan of Treatment: You will need to follow up for parthyroidectomy    Diagnosis: Hypercalcemia  Assessment and Plan of Treatment: Due to primary hyperparathyroidism.   Please follow-up with Endocrinology; Dr. Bay    Diagnosis: Hyperbilirubinemia  Assessment and Plan of Treatment: Likely due to obstruction   now lab WNL  follow-up with your PCP for routine lab monitoring    Diagnosis: Asymptomatic bacteriuria  Assessment and Plan of Treatment: positive urine cx from negative urinalysis  complete prescribed antibiotics as directed      Diagnosis: Vitamin D deficiency  Assessment and Plan of Treatment: Your vitamin D levels are low  continue with vitamin D supplementation as prescribed   follow-up with your PCP    Diagnosis: Elevated LFTs  Assessment and Plan of Treatment: Likely due to obstruction  follow-up with your PCP PRINCIPAL DISCHARGE DIAGNOSIS  Diagnosis: Sepsis  Assessment and Plan of Treatment: You met sepsis parameters on admission but now you have clinically improved  Take all antibiotics as ordered.  Call you Health care provider upon arrival home to make a one week follow up appointment.  If you develop fever, chills, malaise, or change in mental status call your Health Care Provider or go to the Emergency Department.  Nutrition is important, eat small frequent meals to help ensure you get adequate calories.  Do not stay in bed all day!  Increase your activity daily as tolerated.      SECONDARY DISCHARGE DIAGNOSES  Diagnosis: Vitamin D deficiency  Assessment and Plan of Treatment: Your vitamin D levels are low  continue with vitamin D supplementation as prescribed   follow-up with your primary care provider    Diagnosis: Asymptomatic bacteriuria  Assessment and Plan of Treatment: positive urine cx from negative urinalysis  complete prescribed antibiotics as directed      Diagnosis: Hypercalcemia  Assessment and Plan of Treatment: Due to primary hyperparathyroidism.   Please follow-up with Endocrinology; Dr. Bay    Diagnosis: History of hyperparathyroidism  Assessment and Plan of Treatment: You will need to follow up for parthyroidectomy    Diagnosis: Cholangitis  Assessment and Plan of Treatment: Afebrile, no leukocytosis   s/p ERCP on June 24 with stent removal  Blood cultures are  negative   You will be given a prescription for Augmentin which you will take until 7/1/19.        Diagnosis: Obstructive jaundice  Assessment and Plan of Treatment: Secondary to retained biliary stent which was removed during GI proceedure yesterday.  Complete antibiotics as prescribed    Diagnosis: Hyperbilirubinemia  Assessment and Plan of Treatment: Likely due to obstruction   now lab WNL  follow-up with your PCP for routine lab monitoring    Diagnosis: Elevated LFTs  Assessment and Plan of Treatment: Likely due to obstruction  follow-up with your PCP PRINCIPAL DISCHARGE DIAGNOSIS  Diagnosis: Sepsis  Assessment and Plan of Treatment: You met sepsis parameters on admission but now you have clinically improved  Take all antibiotics as ordered.  Call you Health care provider upon arrival home to make a one week follow up appointment.  If you develop fever, chills, malaise, or change in mental status call your Health Care Provider or go to the Emergency Department.  Nutrition is important, eat small frequent meals to help ensure you get adequate calories.  Do not stay in bed all day!  Increase your activity daily as tolerated.      SECONDARY DISCHARGE DIAGNOSES  Diagnosis: Cholangitis  Assessment and Plan of Treatment: Afebrile, no leukocytosis   s/p ERCP on June 24 with stent removal  Blood cultures are  negative   A prescription for Augmentin was sent to your pharmacy which you will take until 7/3/19.        Diagnosis: Vitamin D deficiency  Assessment and Plan of Treatment: Your vitamin D levels are low  continue with vitamin D supplementation as prescribed   follow-up with your primary care provider    Diagnosis: Asymptomatic bacteriuria  Assessment and Plan of Treatment: positive urine cultures from negative urinalysis  complete prescribed antibiotics as directed      Diagnosis: History of hyperparathyroidism  Assessment and Plan of Treatment: You will need to follow up for parthyroidectomy    Diagnosis: Hypercalcemia  Assessment and Plan of Treatment: Due to primary hyperparathyroidism.   Please follow-up with Endocrinology; Dr. Bay    Diagnosis: Obstructive jaundice  Assessment and Plan of Treatment: Secondary to retained biliary stent which was removed during GI proceedure on June 24.  Complete antibiotics as prescribed    Diagnosis: Hyperbilirubinemia  Assessment and Plan of Treatment: Likely due to obstruction   now lab is within normal limits  follow-up with your primary doctor for routine lab monitoring    Diagnosis: Elevated LFTs  Assessment and Plan of Treatment: Likely due to obstruction  follow-up with your primary doctor

## 2019-06-25 NOTE — DISCHARGE NOTE PROVIDER - PROVIDER TOKENS
PROVIDER:[TOKEN:[[id number]]],PROVIDER:[TOKEN:[[id number]]] PROVIDER:[TOKEN:[[id number]],FOLLOWUP:[1-3 days]],PROVIDER:[TOKEN:[[id number]],FOLLOWUP:[1-3 days]] PROVIDER:[TOKEN:[[id number]],FOLLOWUP:[1-3 days]],PROVIDER:[TOKEN:[[id number]],FOLLOWUP:[1-3 days]],PROVIDER:[TOKEN:[[id number]],FOLLOWUP:[1 week]]

## 2019-06-25 NOTE — PROGRESS NOTE ADULT - PROBLEM SELECTOR PLAN 1
sepsis secondary to obstructive jaundice, sepsis resolved  s/p ERCP yesterday  Blood cultures negative   Continue zosyn can change to augmentin upon DC til 7/1 as one per ID

## 2019-06-25 NOTE — PROGRESS NOTE ADULT - ASSESSMENT
49y old  Female from home,  with h/o  Pancreatitis, Hyperparathyroidism? , Diagnosed in 2017, was prescribed Alendronate and Furosemide but refused medications and Surgery.  Gallstones s/p Cholecystectomy ( August 2018), Internal hemorrhoids, Breast implants, Abdominoplasty, chronic constipation, overactive bladder ( was on Mirabegron, s/p bladder surgery) presents to the ER for evaluation of  worsening epigastric pain, vomiting x3, numbness and tingling in her Left arm, chills.  Admitted for sepsis 2/2 obstructive Jaundice.

## 2019-06-25 NOTE — CHART NOTE - NSCHARTNOTEFT_GEN_A_CORE
Patient c/o chills, headache and nausea. No vomiting, no diarrhea. No chest pain, dizziness, SOB or palpitations.  , Oral Temp 100.6, /63 R 16 100% o2 saturation on RA  49y old  Female who presents with a chief complaint of Abdominal pain found to have sepsis secondary to obstructive jaundice secondary to retained biliary stent. S/P ERCP yesterday with stent removal. Now with temp 100.6  Will restart clear diet  IVF  blood culture  CXR  zofran prn  toradol prn  f/u labs

## 2019-06-26 LAB
ALBUMIN SERPL ELPH-MCNC: 3.2 G/DL — LOW (ref 3.5–5)
ALP SERPL-CCNC: 216 U/L — HIGH (ref 40–120)
ALT FLD-CCNC: 295 U/L DA — HIGH (ref 10–60)
ANION GAP SERPL CALC-SCNC: 9 MMOL/L — SIGNIFICANT CHANGE UP (ref 5–17)
AST SERPL-CCNC: 116 U/L — HIGH (ref 10–40)
BILIRUB DIRECT SERPL-MCNC: 0.6 MG/DL — HIGH (ref 0–0.2)
BILIRUB INDIRECT FLD-MCNC: 0.7 MG/DL — SIGNIFICANT CHANGE UP (ref 0.2–1)
BILIRUB SERPL-MCNC: 1.3 MG/DL — HIGH (ref 0.2–1.2)
BUN SERPL-MCNC: 11 MG/DL — SIGNIFICANT CHANGE UP (ref 7–18)
CALCIUM SERPL-MCNC: 11.4 MG/DL — HIGH (ref 8.4–10.5)
CHLORIDE SERPL-SCNC: 107 MMOL/L — SIGNIFICANT CHANGE UP (ref 96–108)
CO2 SERPL-SCNC: 26 MMOL/L — SIGNIFICANT CHANGE UP (ref 22–31)
CREAT SERPL-MCNC: 0.9 MG/DL — SIGNIFICANT CHANGE UP (ref 0.5–1.3)
GLUCOSE SERPL-MCNC: 85 MG/DL — SIGNIFICANT CHANGE UP (ref 70–99)
HCT VFR BLD CALC: 41.4 % — SIGNIFICANT CHANGE UP (ref 34.5–45)
HGB BLD-MCNC: 13.3 G/DL — SIGNIFICANT CHANGE UP (ref 11.5–15.5)
LIDOCAIN IGE QN: 265 U/L — SIGNIFICANT CHANGE UP (ref 73–393)
MAGNESIUM SERPL-MCNC: 2.1 MG/DL — SIGNIFICANT CHANGE UP (ref 1.6–2.6)
MCHC RBC-ENTMCNC: 28.4 PG — SIGNIFICANT CHANGE UP (ref 27–34)
MCHC RBC-ENTMCNC: 32.1 GM/DL — SIGNIFICANT CHANGE UP (ref 32–36)
MCV RBC AUTO: 88.5 FL — SIGNIFICANT CHANGE UP (ref 80–100)
NRBC # BLD: 0 /100 WBCS — SIGNIFICANT CHANGE UP (ref 0–0)
PHOSPHATE SERPL-MCNC: 2.2 MG/DL — LOW (ref 2.5–4.5)
PLATELET # BLD AUTO: 267 K/UL — SIGNIFICANT CHANGE UP (ref 150–400)
POTASSIUM SERPL-MCNC: 3.8 MMOL/L — SIGNIFICANT CHANGE UP (ref 3.5–5.3)
POTASSIUM SERPL-SCNC: 3.8 MMOL/L — SIGNIFICANT CHANGE UP (ref 3.5–5.3)
PROT SERPL-MCNC: 7 G/DL — SIGNIFICANT CHANGE UP (ref 6–8.3)
RBC # BLD: 4.68 M/UL — SIGNIFICANT CHANGE UP (ref 3.8–5.2)
RBC # FLD: 12.1 % — SIGNIFICANT CHANGE UP (ref 10.3–14.5)
SODIUM SERPL-SCNC: 142 MMOL/L — SIGNIFICANT CHANGE UP (ref 135–145)
WBC # BLD: 6.04 K/UL — SIGNIFICANT CHANGE UP (ref 3.8–10.5)
WBC # FLD AUTO: 6.04 K/UL — SIGNIFICANT CHANGE UP (ref 3.8–10.5)

## 2019-06-26 PROCEDURE — 99232 SBSQ HOSP IP/OBS MODERATE 35: CPT

## 2019-06-26 PROCEDURE — 99233 SBSQ HOSP IP/OBS HIGH 50: CPT | Mod: GC

## 2019-06-26 RX ORDER — SENNA PLUS 8.6 MG/1
1 TABLET ORAL AT BEDTIME
Refills: 0 | Status: DISCONTINUED | OUTPATIENT
Start: 2019-06-26 | End: 2019-06-27

## 2019-06-26 RX ORDER — DOCUSATE SODIUM 100 MG
100 CAPSULE ORAL
Refills: 0 | Status: DISCONTINUED | OUTPATIENT
Start: 2019-06-26 | End: 2019-06-27

## 2019-06-26 RX ORDER — POLYETHYLENE GLYCOL 3350 17 G/17G
17 POWDER, FOR SOLUTION ORAL ONCE
Refills: 0 | Status: COMPLETED | OUTPATIENT
Start: 2019-06-26 | End: 2019-06-26

## 2019-06-26 RX ADMIN — POLYETHYLENE GLYCOL 3350 17 GRAM(S): 17 POWDER, FOR SOLUTION ORAL at 18:33

## 2019-06-26 RX ADMIN — Medication 1000 UNIT(S): at 12:07

## 2019-06-26 RX ADMIN — PIPERACILLIN AND TAZOBACTAM 25 GRAM(S): 4; .5 INJECTION, POWDER, LYOPHILIZED, FOR SOLUTION INTRAVENOUS at 17:28

## 2019-06-26 RX ADMIN — Medication 100 MILLIGRAM(S): at 18:33

## 2019-06-26 RX ADMIN — PIPERACILLIN AND TAZOBACTAM 25 GRAM(S): 4; .5 INJECTION, POWDER, LYOPHILIZED, FOR SOLUTION INTRAVENOUS at 05:22

## 2019-06-26 RX ADMIN — SENNA PLUS 1 TABLET(S): 8.6 TABLET ORAL at 21:32

## 2019-06-26 NOTE — PROGRESS NOTE ADULT - ATTENDING COMMENTS
Patient seen/evaluated at bedside. I agree with the resident progress note/outlined plan of care. My independent findings and conclusions are documented.    Chart documents and H&P dated today reviewed    49 year  old Female PMH of  hyperparathyroidism (refused surgery 4 yrs ago, claims the "surgeon didn't know which parathyroid gland was supposed to have surgery"),  multiple plastic surgery procedures, hypercalcemia, Pancreatitis, Gallstones s/p Cholecystectomy presents with sudden severe 10/10 constant epigastric abdominal pain radiating to back.  Patient notes of having similar abdominal pain in Aug 2018 when she had Cholecystectomy in [facility], she was unaware that stent was placed at that time. In Feb 2019, she developed severe pain again and was informed that her stent needed to be removed. Now admitted for same s/t obstructive jaundice w/ plan for ERCP on Monday w/ stent removal.  had sepsis w/ SBP in mid 80s, tachycardia low grade fever and leukocytosis. Currently on zosyn, IVF hydration. To be seen by endocrinology for hypercalcemia (mild)    1???. sepsis on presentation  2. obstructive jaundice retained biliary stent  3. cholangitis  4. hyperparathyroidism  5. hypercalcemia  6. hyperbilirubinemia, transaminitis    met sepsis parameters now clinically improved  c/w zosyn, ID consult requested  serial abd exam  f/u final blood cx  vitamin d level, endocrinology to assess (non urgent)  Pt hypercalcemia is mild (ionized calcium is 1.51) There is no medical contraindication to planned ERCP with stent extrication on Monday  NPO P MN on Sunday evening. Pending serial calcium, may consider calcitonin addition  IVF hydration
Patient seen/evaluated at bedside 6/26/2019. I agree with the resident progress note/outlined plan of care. My independent findings and conclusions are documented.    Feels better today. No recurrence of nausea. Does endorse constipation (claims no BM x 5 days)    1.sepsis on presentation  2. obstructive jaundice retained biliary stent s/p ERCP  3. cholangitis s/t obstructing biliary cholelithiasis s/p ERCP  4. hyperparathyroidism  5. hypercalcemia  6. hyperbilirubinemia, transaminitis (improved today)  7. constipation  8. asymptomatic bacteriuria    met sepsis parameters on presentation and now overall clinically improved. However, had low grade temperature nausea and elevating LFTs yesterday. Concern for transient bacteremia post ERCP. Today symptomatically improved  For now would c/w zosyn until cultures are negative--> likely switch to agumentin on discharge  advance diet  serial abd exam  repeat blood cx are pending--> likely result this evening or tomorrow AM.  Had a positive urine cx from negative urinalysis  Pt hypercalcemia is mild (ionized calcium is 1.51)  continue with IVF hydration--> will f/u with endocrinology for final recommendations based on calcium level prior to discharge  needs referral for outpt parthyroidectomy (previously declined procedure on day of surgical intervention)   bowel regimen: ordered for miralax x 1, senna/colace. If still constipated in the morning, will provide an enema  expected discharge 6/27/19
Patient seen/evaluated at bedside. I agree with the resident progress note/outlined plan of care. My independent findings and conclusions are documented.     ID#[id number]    Pt now post procedural day #1. She reported nausea and mild headache earlier this afternoon. Noted w/ low grade fever to 100.3. No miguel ángel vomiting and denies abdominal pain. Labs today reveal rising LFTs    1.sepsis on presentation  2. obstructive jaundice retained biliary stent s/p ERCP  3. cholangitis s/t obstructing biliary cholelithiasis s/p ERCP  4. hyperparathyroidism  5. hypercalcemia  6. hyperbilirubinemia, transaminitis  7. asymptomatic bacteriuria    met sepsis parameters on presentation and now overall clinically improved. However, had low grade temperature nausea and elevating LFTs. Concern for transient bacteremia post ERCP  For now would c/w zosyn until cultures are negative  -check cxr and 1 set of blood cx  resume lactated ringers 200cc/hr  de-escalate diet to clear liquid diet  serial abd exam  blood cx are currently negative. Had a positive urine cx from negative urinalysis  Pt hypercalcemia is mild (ionized calcium is 1.51)  conitnue with IVF hydration  needs referral for outpt parthyroidectomy (previously declined procedure on day of surgical intervention)   if stable, provide 24 hour notice on Wednesday after prelim cultures
Patient seen/evaluated at bedside on 6/24/2019. I agree with the NP progress note/outlined plan of care. My independent findings and conclusions are documented.    Pt seen post ERCP procedure during which stent and biliary stone were removed.  utilized with updates provided to family at bedside per patient request  No abd pain, nausea/vomiting    1???. sepsis on presentation  2. obstructive jaundice retained biliary stent  3. cholangitis s/t obstructing biliary cholelithiasis  4. hyperparathyroidism  5. hypercalcemia  6. hyperbilirubinemia, transaminitis  7. asymptomatic bacteriuria    met sepsis parameters now clinically improved  c/w zosyn, ID consult appreciated  post procedure diet: clear liquid diet this evening followed by regular diet tomorrow as tolerated  lactated ringers 200cc/hr after 2 liter bolus already initiated in PACU  serial abd exam  blood cx are currently negative. Had a positive urine cx from negative urinalysis  Pt hypercalcemia is mild (ionized calcium is 1.51)  conitnue with IVF hydration  needs referral for outpt parthyroidectomy (previously declined procedure on day of surgical intervention)

## 2019-06-26 NOTE — PROGRESS NOTE ADULT - SUBJECTIVE AND OBJECTIVE BOX
NP Note discussed with  Primary Attending    Patient is a 49y old  Female who presents with a chief complaint of Abdominal pain (26 Jun 2019 16:29)      INTERVAL HPI/OVERNIGHT EVENTS: no new complaints    MEDICATIONS  (STANDING):  cholecalciferol 1000 Unit(s) Oral daily  docusate sodium 100 milliGRAM(s) Oral two times a day  lactated ringers. 1000 milliLiter(s) (100 mL/Hr) IV Continuous <Continuous>  piperacillin/tazobactam IVPB.. 3.375 Gram(s) IV Intermittent every 12 hours  polyethylene glycol 3350 17 Gram(s) Oral once  senna 1 Tablet(s) Oral at bedtime  sodium chloride 0.9%. 1000 milliLiter(s) (100 mL/Hr) IV Continuous <Continuous>    MEDICATIONS  (PRN):  ketorolac 10 milliGRAM(s) Oral every 6 hours PRN Moderate Pain (4 - 6)  ondansetron Injectable 4 milliGRAM(s) IV Push every 8 hours PRN Nausea and/or Vomiting      __________________________________________________  REVIEW OF SYSTEMS:    CONSTITUTIONAL: No fever,   EYES: no acute visual disturbances  NECK: No pain or stiffness  RESPIRATORY: No cough; No shortness of breath  CARDIOVASCULAR: No chest pain, no palpitations  GASTROINTESTINAL: No pain. No nausea or vomiting; No diarrhea   NEUROLOGICAL: No headache or numbness, no tremors  MUSCULOSKELETAL: No joint pain, no muscle pain  GENITOURINARY: no dysuria, no frequency, no hesitancy  PSYCHIATRY: no depression , no anxiety  ALL OTHER  ROS negative        Vital Signs Last 24 Hrs  T(C): 36.4 (26 Jun 2019 12:45), Max: 36.7 (26 Jun 2019 05:26)  T(F): 97.6 (26 Jun 2019 12:45), Max: 98.1 (26 Jun 2019 05:26)  HR: 86 (26 Jun 2019 12:45) (54 - 90)  BP: 141/74 (26 Jun 2019 12:45) (102/50 - 141/74)  BP(mean): --  RR: 16 (26 Jun 2019 12:45) (16 - 16)  SpO2: 98% (26 Jun 2019 12:45) (98% - 100%)    ________________________________________________  PHYSICAL EXAM:  GENERAL: NAD  HEENT: Normocephalic;  conjunctivae and sclerae clear; moist mucous membranes;   NECK : supple  CHEST/LUNG: Clear to auscultation bilaterally with good air entry   HEART: S1 S2  regular; no murmurs, gallops or rubs  ABDOMEN: Soft, Nontender, Nondistended; Bowel sounds present  EXTREMITIES: no cyanosis; no edema; no calf tenderness  SKIN: warm and dry; no rash  NERVOUS SYSTEM:  Awake and alert; Oriented  to place, person and time ; no new deficits    _________________________________________________  LABS:                        13.3   6.04  )-----------( 267      ( 26 Jun 2019 06:45 )             41.4     06-26    142  |  107  |  11  ----------------------------<  85  3.8   |  26  |  0.90    Ca    11.4<H>      26 Jun 2019 06:45  Phos  2.2     06-26  Mg     2.1     06-26    TPro  7.0  /  Alb  3.2<L>  /  TBili  1.3<H>  /  DBili  0.6<H>  /  AST  116<H>  /  ALT  295<H>  /  AlkPhos  216<H>  06-26        CAPILLARY BLOOD GLUCOSE            RADIOLOGY & ADDITIONAL TESTS:    Imaging Personally Reviewed:  YES    < from: CT Abdomen and Pelvis w/ IV Cont (06.20.19 @ 23:50) >  INTERPRETATION:  CLINICAL INFORMATION: History of cholecystectomy.   Epigastric pain in vomiting, elevated LFT.    COMPARISON: Same day ultrasound    PROCEDURE:   Contiguous axial scan of the abdomen and pelvis with intravenous but   without oral contrast, followed by coronal and sagittal reformation. 90   mL of Omnipaque were administered without adverse reaction. 10 mL of   contrast were discarded.    FINDINGS:    LOWER CHEST: Bilateral breast implants without complicating features.    LIVER: Hepatic steatosis. No focal abnormality.  BILE DUCTS: Normal caliber. Stent in the common duct.  GALLBLADDER: Cholecystectomy clips.  SPLEEN: Within normal limits.  PANCREAS: Within normal limits.  ADRENALS: Within normal limits.  KIDNEYS/URETERS: Within normal limits.    BLADDER: Within normal limits.  REPRODUCTIVE ORGANS: Status post supracervical hysterectomy. Unremarkable   adnexa.    BOWEL: No bowel obstruction. Appendix is normal.  PERITONEUM: No ascites.  VESSELS:  Within normal limits.  RETROPERITONEUM: No lymphadenopathy.    ABDOMINAL WALL: Tiny fat-containing ventral hernia.  BONES: No acute abnormality. Vertebral hemangioma of T12. Bilateral   spondylolysis of L5.    IMPRESSION: No acute intra-abdominal abnormality to explain the   presenting symptoms.    Incidental findings including hepatic steatosis, biliary stent in place,   breast implants, bilateral spondylolysis of L5.    < end of copied text >    Consultant(s) Notes Reviewed:   YES    Care Discussed with Consultants :     Plan of care was discussed with patient and /or primary care giver; all questions and concerns were addressed and care was aligned with patient's wishes.

## 2019-06-26 NOTE — PROGRESS NOTE ADULT - SUBJECTIVE AND OBJECTIVE BOX
Subjective:   No pain.     Objective:    MEDICATIONS  (STANDING):  cholecalciferol 1000 Unit(s) Oral daily  lactated ringers. 1000 milliLiter(s) (100 mL/Hr) IV Continuous <Continuous>  piperacillin/tazobactam IVPB.. 3.375 Gram(s) IV Intermittent every 12 hours  sodium chloride 0.9%. 1000 milliLiter(s) (100 mL/Hr) IV Continuous <Continuous>    MEDICATIONS  (PRN):  ketorolac 10 milliGRAM(s) Oral every 6 hours PRN Moderate Pain (4 - 6)  ondansetron Injectable 4 milliGRAM(s) IV Push every 8 hours PRN Nausea and/or Vomiting              Vital Signs Last 24 Hrs  T(C): 36.7 (26 Jun 2019 05:26), Max: 38.1 (25 Jun 2019 15:17)  T(F): 98.1 (26 Jun 2019 05:26), Max: 100.6 (25 Jun 2019 15:17)  HR: 54 (26 Jun 2019 05:26) (54 - 119)  BP: 102/50 (26 Jun 2019 05:26) (102/50 - 149/63)  BP(mean): --  RR: 16 (26 Jun 2019 05:26) (16 - 18)  SpO2: 100% (26 Jun 2019 05:26) (98% - 100%)      General:  Well developed, well nourished, alert and active, no pallor, NAD.  HEENT:    Normal appearance of conjunctiva, ears, nose, lips, oropharynx, and oral mucosa, anicteric.  Neck:  No masses, no asymmetry.  Lymph Nodes:  No lymphadenopathy.   Cardiovascular:  RRR normal S1/S2, no murmur.  Respiratory:  CTA B/L, normal respiratory effort.   Abdominal:   soft, no masses or tenderness, normoactive BS, NT/ND, no HSM.  Extremities:   No clubbing or cyanosis, normal capillary refill, no edema.   Skin:   No rash, jaundice, lesions, eczema.   Musculoskeletal:  No joint swelling, erythema or tenderness.   Neuro: No focal deficits.   Other:       LABS:                        13.3   6.04  )-----------( 267      ( 26 Jun 2019 06:45 )             41.4     06-26    142  |  107  |  11  ----------------------------<  85  3.8   |  26  |  0.90    Ca    11.4<H>      26 Jun 2019 06:45  Phos  2.2     06-26  Mg     2.1     06-26    TPro  7.0  /  Alb  3.2<L>  /  TBili  1.3<H>  /  DBili  0.6<H>  /  AST  116<H>  /  ALT  295<H>  /  AlkPhos  216<H>  06-26          RADIOLOGY & ADDITIONAL TESTS:

## 2019-06-26 NOTE — PROGRESS NOTE ADULT - PROBLEM SELECTOR PLAN 3
F/u outpatient for PTH surgery
hx of hypercalcemia, not compliant with  prescribed alendronate and furosemide   c/w IV hydration  - f/u vit D and monitor calcium level
Pt to follow up outpatient for PTH surgery.
Due to primary hyperparathyroidism.   Pt is not compliant with  prescribed alendronate and furosemide   Continue IV fluids   Endocrine Dr. Bay   Pt to follow up outpatient for PTH surgery

## 2019-06-26 NOTE — PROGRESS NOTE ADULT - PROBLEM SELECTOR PLAN 2
initial AST:888, ALT: 735, trending down,  bilirubin: 3.1 likely due to obstruction    - c/w IV hydration  - Avoid hepatotoxic medications  - for ERCP with stent removal on Monday   - F/u Hepatitis panel and CMP
Likely due to obstruction   Plan as above   Daily CMPs   Avoid hepatotoxic medications   hepatitis panel negative
Likely due to obstruction   hepatitis panel negative
Likely 2/2 obstruction  LFTs trending down, repeat in AM

## 2019-06-26 NOTE — PROGRESS NOTE ADULT - PROVIDER SPECIALTY LIST ADULT
Gastroenterology
Gastroenterology
Hospitalist
Hospitalist
Infectious Disease
Internal Medicine

## 2019-06-26 NOTE — PROGRESS NOTE ADULT - ASSESSMENT
49 year old female s.p stent and stone removal. Had a febrile episode yesterday wit bump in bilirubin. All likely secondary to a transient bacteremia post stent removal. Afebrile now with downtrending bilirubin.     Plan:  Repeat LFTs tomorrow   Continue anbiotics (oral on discharge) for a complete 7 days   Advance ot full liquid today

## 2019-06-26 NOTE — PROGRESS NOTE ADULT - ASSESSMENT
Patient is a 49y old  Female from home,  with h/o  Pancreatitis, Hyperparathyroidism? , Diagnosed in 2017, was prescribed Alendronate and Furosemide but refused medications and Surgery.  Gallstones s/p Cholecystectomy ( August 2018), Internal hemorrhoids, Breast implants, Abdominoplasty, chronic constipation, overactive bladder ( was on Mirabegron, s/p bladder surgery) presents to the ER for evaluation of  worsening epigastric pain, vomiting x3, numbness and tingling in her Left arm, chills.  She went to her gastroenterologist Dr. Echavarria who referred her in ER. On admission, she found to have  tachycardia, HR:119, Hypotensive,  BP 80/90, and Leukocytosis, WBC:14.07, Serum Ca: 11.7, and elevated LFts. The  CT abdomen and pelvis shows Ventral hernia, hepatic steatosis, Biliary stent in place.     # s/p Septic shock - responded to IVF  # Sepsis ( Tachycardia  + Leukocytosis + cholangitis)  # Cholangitis -  She is s/p ERCP with removal of stent, tolerate dthe procedure well 6/25/19  # ?? UTI- E.coli    would recommend:    1. Advanced diet as tolearted  2. Follow up final Blood cultures and Lfts and bilirubin, is trending down  3. Change Zosyn to oral Augmentin 875 mg q 12hour on discharge to continue until 7/3/19, id stay afberile >48 hours  4. OOB to chair     d/w Patient, Covering NPMiguel     will follow the patient with you Patient is a 49y old  Female from home,  with h/o  Pancreatitis, Hyperparathyroidism? , Diagnosed in 2017, was prescribed Alendronate and Furosemide but refused medications and Surgery.  Gallstones s/p Cholecystectomy ( August 2018), Internal hemorrhoids, Breast implants, Abdominoplasty, chronic constipation, overactive bladder ( was on Mirabegron, s/p bladder surgery) presents to the ER for evaluation of  worsening epigastric pain, vomiting x3, numbness and tingling in her Left arm, chills.  She went to her gastroenterologist Dr. Echavarria who referred her in ER. On admission, she found to have  tachycardia, HR:119, Hypotensive,  BP 80/90, and Leukocytosis, WBC:14.07, Serum Ca: 11.7, and elevated LFts. The  CT abdomen and pelvis shows Ventral hernia, hepatic steatosis, Biliary stent in place.     # s/p Septic shock - responded to IVF  # Sepsis ( Tachycardia  + Leukocytosis + cholangitis)  # Cholangitis -  She is s/p ERCP with removal of stent, tolerate dthe procedure well 6/25/19  # ?? UTI- E.coli  # Transient bacteremia- post-procedure ( ERCP)    would recommend:    1. Advanced diet as tolerated   2. Monitor  Ap and  bilirubin, is trending down  3. Change Zosyn to oral Augmentin 875 mg q 12hour on discharge to continue until 7/3/19, if remain afebrile  >48 hours  4. OOB to chair     d/w Patient, Covering NP, Miguel and Dr. Sneed     will follow the patient with you

## 2019-06-26 NOTE — PROGRESS NOTE ADULT - SUBJECTIVE AND OBJECTIVE BOX
Patient is seen and examined at the bed side, is afebrile today.  The AP and bilirubin is trending down.        REVIEW OF SYSTEMS: All other review systems are negative        ALLERGIES: No Known Allergies        Vital Signs Last 24 Hrs  T(C): 36.4 (2019 12:45), Max: 37.3 (2019 17:10)  T(F): 97.6 (2019 12:45), Max: 99.1 (2019 17:10)  HR: 86 (2019 12:45) (54 - 90)  BP: 141/74 (2019 12:45) (102/50 - 141/74)  BP(mean): --  RR: 16 (2019 12:45) (16 - 18)  SpO2: 98% (2019 12:45) (98% - 100%)      PHYSICAL EXAM:  GENERAL: Not in distress   CHEST/LUNG:  Air  entry bilaterally  HEART: s1 and s2 present  ABDOMEN:  Nontender and  Nondistended  EXTREMITIES: No pedal  edema  CNS: Awake and Alert        LABS:                                   13.3   6.04  )-----------( 267      ( 2019 06:45 )             41.4                  13.4   7.43  )-----------( 194      ( 2019 05:55 )             41.9                           14.1   13.96 )-----------( 167      ( 2019 06:10 )             43.7           142  |  107  |  11  ----------------------------<  85  3.8   |  26  |  0.90    Ca    11.4<H>      2019 06:45  Phos  2.2       Mg     2.1         TPro  7.0  /  Alb  3.2<L>  /  TBili  1.3<H>  /  DBili  0.6<H>  /  AST  116<H>  /  ALT  295<H>  /  AlkPhos  216<H>      138  |  106  |  9   ----------------------------<  139<H>  3.4<L>   |  25  |  0.98    Ca    12.2<H>      2019 16:13    TPro  7.8  /  Alb  3.6  /  TBili  2.0<H>  /  DBili  x   /  AST  258<H>  /  ALT  388<H>  /  AlkPhos  271<H>            Urinalysis Basic - ( 2019 20:39 )  Color: Yellow / Appearance: Clear / S.010 / pH: x  Gluc: x / Ketone: Negative  / Bili: Small / Urobili: 8   Blood: x / Protein: Negative / Nitrite: Negative   Leuk Esterase: Negative / RBC: x / WBC x   Sq Epi: x / Non Sq Epi: x / Bacteria: x      MEDICATIONS  (STANDING):  cholecalciferol 1000 Unit(s) Oral daily  lactated ringers. 1000 milliLiter(s) (100 mL/Hr) IV Continuous <Continuous>  piperacillin/tazobactam IVPB.. 3.375 Gram(s) IV Intermittent every 12 hours  sodium chloride 0.9%. 1000 milliLiter(s) (100 mL/Hr) IV Continuous <Continuous>    MEDICATIONS  (PRN):  ketorolac 10 milliGRAM(s) Oral every 6 hours PRN Moderate Pain (4 - 6)  ondansetron Injectable 4 milliGRAM(s) IV Push every 8 hours PRN Nausea and/or Vomiting        RADIOLOGY & ADDITIONAL TESTS:      19 : CT Abdomen and Pelvis w/ IV Cont (19 @ 23:50) : No acute intra-abdominal abnormality to explain the  presenting symptoms. Incidental findings including hepatic steatosis, biliary stent in place, breast implants, bilateral spondylolysis of L5.      MICROBIOLOGY DATA:      Culture - Urine (19 @ 00:29)    -  Amikacin: S <=8    -  Amoxicillin/Clavulanic Acid: S <=8/4    -  Ampicillin: S <=2 These ampicillin results predict results for amoxicillin    -  Ampicillin/Sulbactam: S <=4/2 Enterobacter, Citrobacter, and Serratia may develop resistance during prolonged therapy (3-4 days)    -  Aztreonam: S <=4    -  Cefazolin: S <=2 For uncomplicated UTI with K. pneumoniae, E. coli, or P. mirablis: TRACEY <=16 is sensitive and TRACEY >=32 is resistant. This also predicts results for oral agents cefaclor, cefdinir, cefpodoxime, cefprozil, cefuroxime axetil, cephalexin and locarbef for uncomplicated UTI. Note that some isolates may be susceptible to these agents while testing resistant to cefazolin.    -  Cefepime: S <=2    -  Cefoxitin: S <=4    -  Ceftriaxone: S <=1 Enterobacter, Citrobacter, and Serratia may develop resistance during prolonged therapy    -  Ciprofloxacin: S <=0.5    -  Ertapenem: S <=0.5    -  Gentamicin: S <=1    -  Imipenem: S <=1    -  Levofloxacin: S <=1    -  Meropenem: S <=1    -  Nitrofurantoin: S <=32 Should not be used to treat pyelonephritis    -  Piperacillin/Tazobactam: S <=8    -  Tigecycline: S <=1    -  Tobramycin: S <=2    -  Trimethoprim/Sulfamethoxazole: S <=0.5/9.5    Specimen Source: .Urine    Culture Results:   >100,000 CFU/ml Escherichia coli    Organism Identification: Escherichia coli    Organism: Escherichia coli    Method Type: TRACEY Patient is seen and examined at the bed side, is afebrile today and feeling better. She is tolerating regular diet  also.   The AP and bilirubin is trending down.        REVIEW OF SYSTEMS: All other review systems are negative        ALLERGIES: No Known Allergies        Vital Signs Last 24 Hrs  T(C): 36.4 (2019 12:45), Max: 37.3 (2019 17:10)  T(F): 97.6 (2019 12:45), Max: 99.1 (2019 17:10)  HR: 86 (2019 12:45) (54 - 90)  BP: 141/74 (2019 12:45) (102/50 - 141/74)  BP(mean): --  RR: 16 (2019 12:45) (16 - 18)  SpO2: 98% (2019 12:45) (98% - 100%)        PHYSICAL EXAM:  GENERAL: Not in distress   CHEST/LUNG:  Air  entry bilaterally  HEART: s1 and s2 present  ABDOMEN:  Nontender and  Nondistended  EXTREMITIES: No pedal  edema  CNS: Awake and Alert        LABS:                                   13.3   6.04  )-----------( 267      ( 2019 06:45 )             41.4                  13.4   7.43  )-----------( 194      ( 2019 05:55 )             41.9                           14.1   13.96 )-----------( 167      ( 2019 06:10 )             43.7           142  |  107  |  11  ----------------------------<  85  3.8   |  26  |  0.90    Ca    11.4<H>      2019 06:45  Phos  2.2       Mg     2.1         TPro  7.0  /  Alb  3.2<L>  /  TBili  1.3<H>  /  DBili  0.6<H>  /  AST  116<H>  /  ALT  295<H>  /  AlkPhos  216<H>      138  |  106  |  9   ----------------------------<  139<H>  3.4<L>   |  25  |  0.98    Ca    12.2<H>      2019 16:13    TPro  7.8  /  Alb  3.6  /  TBili  2.0<H>  /  DBili  x   /  AST  258<H>  /  ALT  388<H>  /  AlkPhos  271<H>            Urinalysis Basic - ( 2019 20:39 )  Color: Yellow / Appearance: Clear / S.010 / pH: x  Gluc: x / Ketone: Negative  / Bili: Small / Urobili: 8   Blood: x / Protein: Negative / Nitrite: Negative   Leuk Esterase: Negative / RBC: x / WBC x   Sq Epi: x / Non Sq Epi: x / Bacteria: x      MEDICATIONS  (STANDING):  cholecalciferol 1000 Unit(s) Oral daily  lactated ringers. 1000 milliLiter(s) (100 mL/Hr) IV Continuous <Continuous>  piperacillin/tazobactam IVPB.. 3.375 Gram(s) IV Intermittent every 12 hours  sodium chloride 0.9%. 1000 milliLiter(s) (100 mL/Hr) IV Continuous <Continuous>    MEDICATIONS  (PRN):  ketorolac 10 milliGRAM(s) Oral every 6 hours PRN Moderate Pain (4 - 6)  ondansetron Injectable 4 milliGRAM(s) IV Push every 8 hours PRN Nausea and/or Vomiting        RADIOLOGY & ADDITIONAL TESTS:      19 : CT Abdomen and Pelvis w/ IV Cont (19 @ 23:50) : No acute intra-abdominal abnormality to explain the  presenting symptoms. Incidental findings including hepatic steatosis, biliary stent in place, breast implants, bilateral spondylolysis of L5.      MICROBIOLOGY DATA:      Culture - Urine (19 @ 00:29)    -  Amikacin: S <=8    -  Amoxicillin/Clavulanic Acid: S <=8/4    -  Ampicillin: S <=2 These ampicillin results predict results for amoxicillin    -  Ampicillin/Sulbactam: S <=4/2 Enterobacter, Citrobacter, and Serratia may develop resistance during prolonged therapy (3-4 days)    -  Aztreonam: S <=4    -  Cefazolin: S <=2 For uncomplicated UTI with K. pneumoniae, E. coli, or P. mirablis: TRACEY <=16 is sensitive and TRACEY >=32 is resistant. This also predicts results for oral agents cefaclor, cefdinir, cefpodoxime, cefprozil, cefuroxime axetil, cephalexin and locarbef for uncomplicated UTI. Note that some isolates may be susceptible to these agents while testing resistant to cefazolin.    -  Cefepime: S <=2    -  Cefoxitin: S <=4    -  Ceftriaxone: S <=1 Enterobacter, Citrobacter, and Serratia may develop resistance during prolonged therapy    -  Ciprofloxacin: S <=0.5    -  Ertapenem: S <=0.5    -  Gentamicin: S <=1    -  Imipenem: S <=1    -  Levofloxacin: S <=1    -  Meropenem: S <=1    -  Nitrofurantoin: S <=32 Should not be used to treat pyelonephritis    -  Piperacillin/Tazobactam: S <=8    -  Tigecycline: S <=1    -  Tobramycin: S <=2    -  Trimethoprim/Sulfamethoxazole: S <=0.5/9.5    Specimen Source: .Urine    Culture Results:   >100,000 CFU/ml Escherichia coli    Organism Identification: Escherichia coli    Organism: Escherichia coli    Method Type: TRACEY

## 2019-06-26 NOTE — PROGRESS NOTE ADULT - ASSESSMENT
49y old  Female from home,  with h/o  Pancreatitis, Hyperparathyroidism? , Diagnosed in 2017, was prescribed Alendronate and Furosemide but refused medications and Surgery.  Gallstones s/p Cholecystectomy ( August 2018), Internal hemorrhoids, Breast implants, Abdominoplasty, chronic constipation, overactive bladder ( was on Mirabegron, s/p bladder surgery) presents to the ER for evaluation of  worsening epigastric pain, vomiting x3, numbness and tingling in her Left arm, chills.  Admitted for sepsis 2/2 obstructive Jaundice.    MRCP to remove stent on 6/24.  Pt spiked temp and nausea on 6/25.  Today she is resting comfortably.  Tolerated diet advancement, afebrile, stable vitals, NAD.

## 2019-06-27 ENCOUNTER — TRANSCRIPTION ENCOUNTER (OUTPATIENT)
Age: 49
End: 2019-06-27

## 2019-06-27 VITALS
OXYGEN SATURATION: 99 % | RESPIRATION RATE: 17 BRPM | SYSTOLIC BLOOD PRESSURE: 122 MMHG | DIASTOLIC BLOOD PRESSURE: 89 MMHG | TEMPERATURE: 98 F | HEART RATE: 100 BPM

## 2019-06-27 LAB
ALBUMIN SERPL ELPH-MCNC: 3.3 G/DL — LOW (ref 3.5–5)
ALP SERPL-CCNC: 201 U/L — HIGH (ref 40–120)
ALT FLD-CCNC: 252 U/L DA — HIGH (ref 10–60)
ANION GAP SERPL CALC-SCNC: 7 MMOL/L — SIGNIFICANT CHANGE UP (ref 5–17)
AST SERPL-CCNC: 73 U/L — HIGH (ref 10–40)
BILIRUB DIRECT SERPL-MCNC: 0.3 MG/DL — HIGH (ref 0–0.2)
BILIRUB INDIRECT FLD-MCNC: 0.4 MG/DL — SIGNIFICANT CHANGE UP (ref 0.2–1)
BILIRUB SERPL-MCNC: 0.7 MG/DL — SIGNIFICANT CHANGE UP (ref 0.2–1.2)
BUN SERPL-MCNC: 11 MG/DL — SIGNIFICANT CHANGE UP (ref 7–18)
CALCIUM SERPL-MCNC: 11.2 MG/DL — HIGH (ref 8.4–10.5)
CHLORIDE SERPL-SCNC: 107 MMOL/L — SIGNIFICANT CHANGE UP (ref 96–108)
CO2 SERPL-SCNC: 27 MMOL/L — SIGNIFICANT CHANGE UP (ref 22–31)
CREAT SERPL-MCNC: 0.84 MG/DL — SIGNIFICANT CHANGE UP (ref 0.5–1.3)
CULTURE RESULTS: SIGNIFICANT CHANGE UP
CULTURE RESULTS: SIGNIFICANT CHANGE UP
GLUCOSE SERPL-MCNC: 104 MG/DL — HIGH (ref 70–99)
HCT VFR BLD CALC: 43 % — SIGNIFICANT CHANGE UP (ref 34.5–45)
HGB BLD-MCNC: 14 G/DL — SIGNIFICANT CHANGE UP (ref 11.5–15.5)
MCHC RBC-ENTMCNC: 28.6 PG — SIGNIFICANT CHANGE UP (ref 27–34)
MCHC RBC-ENTMCNC: 32.6 GM/DL — SIGNIFICANT CHANGE UP (ref 32–36)
MCV RBC AUTO: 87.8 FL — SIGNIFICANT CHANGE UP (ref 80–100)
NRBC # BLD: 0 /100 WBCS — SIGNIFICANT CHANGE UP (ref 0–0)
PLATELET # BLD AUTO: 276 K/UL — SIGNIFICANT CHANGE UP (ref 150–400)
POTASSIUM SERPL-MCNC: 3.5 MMOL/L — SIGNIFICANT CHANGE UP (ref 3.5–5.3)
POTASSIUM SERPL-SCNC: 3.5 MMOL/L — SIGNIFICANT CHANGE UP (ref 3.5–5.3)
PROT SERPL-MCNC: 7.3 G/DL — SIGNIFICANT CHANGE UP (ref 6–8.3)
RBC # BLD: 4.9 M/UL — SIGNIFICANT CHANGE UP (ref 3.8–5.2)
RBC # FLD: 11.9 % — SIGNIFICANT CHANGE UP (ref 10.3–14.5)
SODIUM SERPL-SCNC: 141 MMOL/L — SIGNIFICANT CHANGE UP (ref 135–145)
SPECIMEN SOURCE: SIGNIFICANT CHANGE UP
SPECIMEN SOURCE: SIGNIFICANT CHANGE UP
WBC # BLD: 6.29 K/UL — SIGNIFICANT CHANGE UP (ref 3.8–10.5)
WBC # FLD AUTO: 6.29 K/UL — SIGNIFICANT CHANGE UP (ref 3.8–10.5)

## 2019-06-27 PROCEDURE — 99239 HOSP IP/OBS DSCHRG MGMT >30: CPT | Mod: GC

## 2019-06-27 RX ADMIN — Medication 100 MILLIGRAM(S): at 05:12

## 2019-06-27 RX ADMIN — Medication 1000 UNIT(S): at 11:31

## 2019-06-27 RX ADMIN — PIPERACILLIN AND TAZOBACTAM 25 GRAM(S): 4; .5 INJECTION, POWDER, LYOPHILIZED, FOR SOLUTION INTRAVENOUS at 05:12

## 2019-06-27 NOTE — DISCHARGE NOTE NURSING/CASE MANAGEMENT/SOCIAL WORK - NSDCDPATPORTLINK_GEN_ALL_CORE
You can access the [facility], offered by [facility], by registering with the following website: [email or web address]

## 2019-07-01 LAB
CULTURE RESULTS: SIGNIFICANT CHANGE UP
CULTURE RESULTS: SIGNIFICANT CHANGE UP
SPECIMEN SOURCE: SIGNIFICANT CHANGE UP
SPECIMEN SOURCE: SIGNIFICANT CHANGE UP

## 2019-07-10 PROCEDURE — 82550 ASSAY OF CK (CPK): CPT

## 2019-07-10 PROCEDURE — 71045 X-RAY EXAM CHEST 1 VIEW: CPT

## 2019-07-10 PROCEDURE — 87086 URINE CULTURE/COLONY COUNT: CPT

## 2019-07-10 PROCEDURE — 84436 ASSAY OF TOTAL THYROXINE: CPT

## 2019-07-10 PROCEDURE — 82746 ASSAY OF FOLIC ACID SERUM: CPT

## 2019-07-10 PROCEDURE — 82330 ASSAY OF CALCIUM: CPT

## 2019-07-10 PROCEDURE — 85027 COMPLETE CBC AUTOMATED: CPT

## 2019-07-10 PROCEDURE — 82607 VITAMIN B-12: CPT

## 2019-07-10 PROCEDURE — 84702 CHORIONIC GONADOTROPIN TEST: CPT

## 2019-07-10 PROCEDURE — 80053 COMPREHEN METABOLIC PANEL: CPT

## 2019-07-10 PROCEDURE — 83690 ASSAY OF LIPASE: CPT

## 2019-07-10 PROCEDURE — 81003 URINALYSIS AUTO W/O SCOPE: CPT

## 2019-07-10 PROCEDURE — 84480 ASSAY TRIIODOTHYRONINE (T3): CPT

## 2019-07-10 PROCEDURE — 80076 HEPATIC FUNCTION PANEL: CPT

## 2019-07-10 PROCEDURE — 85610 PROTHROMBIN TIME: CPT

## 2019-07-10 PROCEDURE — 85730 THROMBOPLASTIN TIME PARTIAL: CPT

## 2019-07-10 PROCEDURE — 83036 HEMOGLOBIN GLYCOSYLATED A1C: CPT

## 2019-07-10 PROCEDURE — 93005 ELECTROCARDIOGRAM TRACING: CPT

## 2019-07-10 PROCEDURE — 82248 BILIRUBIN DIRECT: CPT

## 2019-07-10 PROCEDURE — 84443 ASSAY THYROID STIM HORMONE: CPT

## 2019-07-10 PROCEDURE — 83970 ASSAY OF PARATHORMONE: CPT

## 2019-07-10 PROCEDURE — 36415 COLL VENOUS BLD VENIPUNCTURE: CPT

## 2019-07-10 PROCEDURE — 83605 ASSAY OF LACTIC ACID: CPT

## 2019-07-10 PROCEDURE — 80061 LIPID PANEL: CPT

## 2019-07-10 PROCEDURE — 80048 BASIC METABOLIC PNL TOTAL CA: CPT

## 2019-07-10 PROCEDURE — 87040 BLOOD CULTURE FOR BACTERIA: CPT

## 2019-07-10 PROCEDURE — 82310 ASSAY OF CALCIUM: CPT

## 2019-07-10 PROCEDURE — 76705 ECHO EXAM OF ABDOMEN: CPT

## 2019-07-10 PROCEDURE — 82652 VIT D 1 25-DIHYDROXY: CPT

## 2019-07-10 PROCEDURE — 84484 ASSAY OF TROPONIN QUANT: CPT

## 2019-07-10 PROCEDURE — 83735 ASSAY OF MAGNESIUM: CPT

## 2019-07-10 PROCEDURE — 82553 CREATINE MB FRACTION: CPT

## 2019-07-10 PROCEDURE — 82977 ASSAY OF GGT: CPT

## 2019-07-10 PROCEDURE — 76000 FLUOROSCOPY <1 HR PHYS/QHP: CPT

## 2019-07-10 PROCEDURE — 81025 URINE PREGNANCY TEST: CPT

## 2019-07-10 PROCEDURE — 99285 EMERGENCY DEPT VISIT HI MDM: CPT | Mod: 25

## 2019-07-10 PROCEDURE — 74177 CT ABD & PELVIS W/CONTRAST: CPT

## 2019-07-10 PROCEDURE — C1769: CPT

## 2019-07-10 PROCEDURE — 87186 SC STD MICRODIL/AGAR DIL: CPT

## 2019-07-10 PROCEDURE — 84100 ASSAY OF PHOSPHORUS: CPT

## 2019-07-10 PROCEDURE — 80307 DRUG TEST PRSMV CHEM ANLYZR: CPT

## 2019-07-10 PROCEDURE — 82306 VITAMIN D 25 HYDROXY: CPT

## 2019-07-10 PROCEDURE — 80074 ACUTE HEPATITIS PANEL: CPT

## 2019-11-01 PROBLEM — Z86.39 PERSONAL HISTORY OF OTHER ENDOCRINE, NUTRITIONAL AND METABOLIC DISEASE: Chronic | Status: ACTIVE | Noted: 2019-06-21

## 2019-11-01 PROBLEM — K85.90 ACUTE PANCREATITIS WITHOUT NECROSIS OR INFECTION, UNSPECIFIED: Chronic | Status: ACTIVE | Noted: 2019-06-21

## 2019-11-01 PROBLEM — K75.9 INFLAMMATORY LIVER DISEASE, UNSPECIFIED: Chronic | Status: ACTIVE | Noted: 2019-06-21

## 2019-11-01 PROBLEM — Z87.19 PERSONAL HISTORY OF OTHER DISEASES OF THE DIGESTIVE SYSTEM: Chronic | Status: ACTIVE | Noted: 2019-06-21

## 2019-11-04 ENCOUNTER — OUTPATIENT (OUTPATIENT)
Dept: OUTPATIENT SERVICES | Facility: HOSPITAL | Age: 49
LOS: 1 days | End: 2019-11-04
Payer: MEDICAID

## 2019-11-04 VITALS
HEART RATE: 85 BPM | OXYGEN SATURATION: 99 % | DIASTOLIC BLOOD PRESSURE: 84 MMHG | TEMPERATURE: 97 F | RESPIRATION RATE: 18 BRPM | HEIGHT: 62 IN | SYSTOLIC BLOOD PRESSURE: 125 MMHG | WEIGHT: 149.91 LBS

## 2019-11-04 DIAGNOSIS — N39.3 STRESS INCONTINENCE (FEMALE) (MALE): ICD-10-CM

## 2019-11-04 DIAGNOSIS — N81.5 VAGINAL ENTEROCELE: ICD-10-CM

## 2019-11-04 DIAGNOSIS — Z98.890 OTHER SPECIFIED POSTPROCEDURAL STATES: Chronic | ICD-10-CM

## 2019-11-04 DIAGNOSIS — Z01.818 ENCOUNTER FOR OTHER PREPROCEDURAL EXAMINATION: ICD-10-CM

## 2019-11-04 DIAGNOSIS — Z90.49 ACQUIRED ABSENCE OF OTHER SPECIFIED PARTS OF DIGESTIVE TRACT: Chronic | ICD-10-CM

## 2019-11-04 DIAGNOSIS — Z90.710 ACQUIRED ABSENCE OF BOTH CERVIX AND UTERUS: Chronic | ICD-10-CM

## 2019-11-04 DIAGNOSIS — Z98.82 BREAST IMPLANT STATUS: Chronic | ICD-10-CM

## 2019-11-04 DIAGNOSIS — Z87.448 PERSONAL HISTORY OF OTHER DISEASES OF URINARY SYSTEM: Chronic | ICD-10-CM

## 2019-11-04 DIAGNOSIS — N81.10 CYSTOCELE, UNSPECIFIED: ICD-10-CM

## 2019-11-04 LAB — BLD GP AB SCN SERPL QL: SIGNIFICANT CHANGE UP

## 2019-11-04 PROCEDURE — G0463: CPT

## 2019-11-04 RX ORDER — SODIUM CHLORIDE 9 MG/ML
3 INJECTION INTRAMUSCULAR; INTRAVENOUS; SUBCUTANEOUS EVERY 8 HOURS
Refills: 0 | Status: DISCONTINUED | OUTPATIENT
Start: 2019-11-13 | End: 2019-11-22

## 2019-11-04 NOTE — H&P PST ADULT - NSICDXPROBLEM_GEN_ALL_CORE_FT
PROBLEM DIAGNOSES  Problem: Female cystocele  Assessment and Plan: Patient scheduled for enterocele and cystocele repair on 11/13/19. Preop instructions given.

## 2019-11-04 NOTE — H&P PST ADULT - NSICDXPASTSURGICALHX_GEN_ALL_CORE_FT
PAST SURGICAL HISTORY:  H/O abdominoplasty     H/O breast implant     H/O: hysterectomy 2009    History of biliary stent insertion     History of cystocele cystocele repair x 2    S/P cholecystectomy

## 2019-11-04 NOTE — H&P PST ADULT - NSANTHOSAYNRD_GEN_A_CORE
No. SAMIRA screening performed.  STOP BANG Legend: 0-2 = LOW Risk; 3-4 = INTERMEDIATE Risk; 5-8 = HIGH Risk

## 2019-11-04 NOTE — H&P PST ADULT - HISTORY OF PRESENT ILLNESS
49 yr old with PMH of Pancreatitis, Hyperparathyroidism? ( Diagnosed in 2017, was prescribed Alendronate and Furosemide but refused medications and Surgery), s/p Cholecystectomy, Internal hemorrhoids, Breast implants, Abdominoplasty, chronic constipation, overactive bladder, s/p cystocele surgery x 2 is here today for presurgical testing. Patient c/o inability to hold and leaking urine and is now she scheduled for anterior and posterior colporrhaphy with enterocele and cystocele repair on 11/13/19.

## 2019-11-04 NOTE — H&P PST ADULT - NSICDXPASTMEDICALHX_GEN_ALL_CORE_FT
PAST MEDICAL HISTORY:  Female cystocele     H/O cholecystitis     H/O hypercalcemia     Hepatitis     History of hyperparathyroidism     Pancreatitis     Stress incontinence, female     Vaginal enterocele

## 2019-11-12 ENCOUNTER — TRANSCRIPTION ENCOUNTER (OUTPATIENT)
Age: 49
End: 2019-11-12

## 2019-11-13 ENCOUNTER — RESULT REVIEW (OUTPATIENT)
Age: 49
End: 2019-11-13

## 2019-11-13 ENCOUNTER — OUTPATIENT (OUTPATIENT)
Dept: OUTPATIENT SERVICES | Facility: HOSPITAL | Age: 49
LOS: 1 days | End: 2019-11-13
Payer: MEDICAID

## 2019-11-13 VITALS
OXYGEN SATURATION: 100 % | SYSTOLIC BLOOD PRESSURE: 138 MMHG | TEMPERATURE: 98 F | WEIGHT: 149.91 LBS | DIASTOLIC BLOOD PRESSURE: 85 MMHG | HEIGHT: 62 IN | RESPIRATION RATE: 17 BRPM | HEART RATE: 81 BPM

## 2019-11-13 VITALS
DIASTOLIC BLOOD PRESSURE: 65 MMHG | SYSTOLIC BLOOD PRESSURE: 109 MMHG | RESPIRATION RATE: 14 BRPM | OXYGEN SATURATION: 98 % | HEART RATE: 82 BPM | TEMPERATURE: 98 F

## 2019-11-13 DIAGNOSIS — Z98.82 BREAST IMPLANT STATUS: Chronic | ICD-10-CM

## 2019-11-13 DIAGNOSIS — Z98.890 OTHER SPECIFIED POSTPROCEDURAL STATES: Chronic | ICD-10-CM

## 2019-11-13 DIAGNOSIS — N39.3 STRESS INCONTINENCE (FEMALE) (MALE): ICD-10-CM

## 2019-11-13 DIAGNOSIS — Z87.448 PERSONAL HISTORY OF OTHER DISEASES OF URINARY SYSTEM: Chronic | ICD-10-CM

## 2019-11-13 DIAGNOSIS — N81.5 VAGINAL ENTEROCELE: ICD-10-CM

## 2019-11-13 DIAGNOSIS — N81.10 CYSTOCELE, UNSPECIFIED: ICD-10-CM

## 2019-11-13 DIAGNOSIS — Z01.818 ENCOUNTER FOR OTHER PREPROCEDURAL EXAMINATION: ICD-10-CM

## 2019-11-13 DIAGNOSIS — Z90.49 ACQUIRED ABSENCE OF OTHER SPECIFIED PARTS OF DIGESTIVE TRACT: Chronic | ICD-10-CM

## 2019-11-13 DIAGNOSIS — Z90.710 ACQUIRED ABSENCE OF BOTH CERVIX AND UTERUS: Chronic | ICD-10-CM

## 2019-11-13 LAB — BLD GP AB SCN SERPL QL: SIGNIFICANT CHANGE UP

## 2019-11-13 PROCEDURE — 36415 COLL VENOUS BLD VENIPUNCTURE: CPT

## 2019-11-13 PROCEDURE — 57267 INSERT MESH/PELVIC FLR ADDON: CPT

## 2019-11-13 PROCEDURE — 88305 TISSUE EXAM BY PATHOLOGIST: CPT

## 2019-11-13 PROCEDURE — 88305 TISSUE EXAM BY PATHOLOGIST: CPT | Mod: 26

## 2019-11-13 PROCEDURE — 57240 ANTERIOR COLPORRHAPHY: CPT

## 2019-11-13 PROCEDURE — 86901 BLOOD TYPING SEROLOGIC RH(D): CPT

## 2019-11-13 PROCEDURE — 57288 REPAIR BLADDER DEFECT: CPT | Mod: XS

## 2019-11-13 PROCEDURE — 57295 REVISE VAG GRAFT VIA VAGINA: CPT

## 2019-11-13 PROCEDURE — 99261: CPT

## 2019-11-13 PROCEDURE — 86850 RBC ANTIBODY SCREEN: CPT

## 2019-11-13 PROCEDURE — 86900 BLOOD TYPING SEROLOGIC ABO: CPT

## 2019-11-13 PROCEDURE — C1771: CPT

## 2019-11-13 RX ORDER — IBUPROFEN 200 MG
600 TABLET ORAL EVERY 6 HOURS
Refills: 0 | Status: DISCONTINUED | OUTPATIENT
Start: 2019-11-13 | End: 2019-11-22

## 2019-11-13 RX ORDER — HYDROMORPHONE HYDROCHLORIDE 2 MG/ML
0.5 INJECTION INTRAMUSCULAR; INTRAVENOUS; SUBCUTANEOUS
Refills: 0 | Status: DISCONTINUED | OUTPATIENT
Start: 2019-11-13 | End: 2019-11-13

## 2019-11-13 RX ORDER — SODIUM CHLORIDE 9 MG/ML
1000 INJECTION, SOLUTION INTRAVENOUS
Refills: 0 | Status: DISCONTINUED | OUTPATIENT
Start: 2019-11-13 | End: 2019-11-13

## 2019-11-13 RX ORDER — ONDANSETRON 8 MG/1
4 TABLET, FILM COATED ORAL ONCE
Refills: 0 | Status: DISCONTINUED | OUTPATIENT
Start: 2019-11-13 | End: 2019-11-13

## 2019-11-13 NOTE — ASU DISCHARGE PLAN (ADULT/PEDIATRIC) - CARE PROVIDER_API CALL
Don Child)  Obstetrics and Gynecology  [address] [address] [place] [address]  Phone: [phone]  Fax: [phone]  Follow Up Time:

## 2019-11-13 NOTE — ASU DISCHARGE PLAN (ADULT/PEDIATRIC) - ACTIVITY LEVEL
No douching/No intercourse/No excercise/No tampons No douching/No intercourse/No excercise/No heavy lifting/No tampons

## 2019-11-13 NOTE — ASU DISCHARGE PLAN (ADULT/PEDIATRIC) - CALL YOUR DOCTOR IF YOU HAVE ANY OF THE FOLLOWING:
Wound/Surgical Site with redness, or foul smelling discharge or pus/Unable to urinate/Bleeding that does not stop/Swelling that gets worse Bleeding that does not stop/Unable to urinate/Wound/Surgical Site with redness, or foul smelling discharge or pus/Swelling that gets worse/Fever greater than (need to indicate Fahrenheit or Celsius)

## 2019-11-13 NOTE — ASU PATIENT PROFILE, ADULT - PMH
Female cystocele    H/O cholecystitis    H/O hypercalcemia    Hepatitis    History of hyperparathyroidism    Pancreatitis    Stress incontinence, female    Vaginal enterocele

## 2019-11-15 LAB — SURGICAL PATHOLOGY STUDY: SIGNIFICANT CHANGE UP

## 2020-08-06 PROBLEM — N81.5 VAGINAL ENTEROCELE: Chronic | Status: ACTIVE | Noted: 2019-11-04

## 2020-08-06 PROBLEM — N39.3 STRESS INCONTINENCE (FEMALE) (MALE): Chronic | Status: ACTIVE | Noted: 2019-11-04

## 2020-08-06 PROBLEM — N81.10 CYSTOCELE, UNSPECIFIED: Chronic | Status: ACTIVE | Noted: 2019-11-04

## 2020-08-07 PROBLEM — Z00.00 ENCOUNTER FOR PREVENTIVE HEALTH EXAMINATION: Status: ACTIVE | Noted: 2020-08-07

## 2020-08-27 ENCOUNTER — LABORATORY RESULT (OUTPATIENT)
Age: 50
End: 2020-08-27

## 2020-08-27 ENCOUNTER — APPOINTMENT (OUTPATIENT)
Dept: SURGERY | Facility: CLINIC | Age: 50
End: 2020-08-27
Payer: MEDICAID

## 2020-08-27 VITALS
HEART RATE: 105 BPM | HEIGHT: 62 IN | WEIGHT: 155 LBS | BODY MASS INDEX: 28.52 KG/M2 | DIASTOLIC BLOOD PRESSURE: 82 MMHG | SYSTOLIC BLOOD PRESSURE: 120 MMHG

## 2020-08-27 DIAGNOSIS — Z78.9 OTHER SPECIFIED HEALTH STATUS: ICD-10-CM

## 2020-08-27 DIAGNOSIS — E04.1 NONTOXIC SINGLE THYROID NODULE: ICD-10-CM

## 2020-08-27 PROCEDURE — 10005 FNA BX W/US GDN 1ST LES: CPT

## 2020-08-27 PROCEDURE — 99204 OFFICE O/P NEW MOD 45 MIN: CPT

## 2020-08-28 NOTE — HISTORY OF PRESENT ILLNESS
[de-identified] : Pt c/o elevated calcium for 7 years  occasional constipation and fatigue.   denies kidney stones or bone pain, dysphagia, hoarseness or RT exposure\par Ca 12.2,   ,   Vitamin  D 16\par sonogram:   left thyroid nodule 1.5 cm\par bone density:  osteopenia

## 2020-08-28 NOTE — PHYSICAL EXAM
[de-identified] : 1 cm left thyroid nodule, well circumscribed and mobile [Laryngoscopy Performed] : laryngoscopy was performed, see procedure section for findings [Midline] : located in midline position [Normal] : orientation to person, place, and time: normal [de-identified] : indirect  laryngoscopy shows normal vocal cord mobility bilaterally with no lesions noted

## 2020-08-28 NOTE — ASSESSMENT
[FreeTextEntry1] : lengthy discussion regarding options for management. in view of labs and symptoms have recommended minimally invasive parathyroidectomy with PTH assay.  will require preop 4D CT.  risks, benefits and alternatives discussed at length.  I have discussed with the patient the anatomy of the area, the pathophysiology of the disease process and the rationale for surgery.  The attendant risks, possible complications and expected postoperative course have been discussed in detail.  I have given the patient the opportunity to ask questions, and all questions have been answered to the patient's satisfaction. sonogram guided fine needle aspiration left thyroid nodule performed. to call next week for results. \par

## 2020-08-28 NOTE — CONSULT LETTER
[Dear  ___] : Dear  [unfilled], [Consult Letter:] : I had the pleasure of evaluating your patient, [unfilled]. [Please see my note below.] : Please see my note below. [Sincerely,] : Sincerely, [Consult Closing:] : Thank you very much for allowing me to participate in the care of this patient.  If you have any questions, please do not hesitate to contact me. [FreeTextEntry3] : Tommy Regalado MD, FACS\par System Director, Endocrine Surgery\par [facility]\par Assistant Clinical Professor of Surgery\par [facility] [employer] [facility]  [FreeTextEntry2] : Dr. Jerardo Gonzalez, Dr. Rodolfo Weeks [DrDave  ___] : Dr. HEBERT

## 2020-09-02 ENCOUNTER — APPOINTMENT (OUTPATIENT)
Dept: CT IMAGING | Facility: IMAGING CENTER | Age: 50
End: 2020-09-02
Payer: MEDICAID

## 2020-09-02 ENCOUNTER — OUTPATIENT (OUTPATIENT)
Dept: OUTPATIENT SERVICES | Facility: HOSPITAL | Age: 50
LOS: 1 days | End: 2020-09-02
Payer: MEDICAID

## 2020-09-02 DIAGNOSIS — Z98.890 OTHER SPECIFIED POSTPROCEDURAL STATES: Chronic | ICD-10-CM

## 2020-09-02 DIAGNOSIS — E04.1 NONTOXIC SINGLE THYROID NODULE: ICD-10-CM

## 2020-09-02 DIAGNOSIS — Z98.82 BREAST IMPLANT STATUS: Chronic | ICD-10-CM

## 2020-09-02 DIAGNOSIS — Z87.448 PERSONAL HISTORY OF OTHER DISEASES OF URINARY SYSTEM: Chronic | ICD-10-CM

## 2020-09-02 DIAGNOSIS — Z90.49 ACQUIRED ABSENCE OF OTHER SPECIFIED PARTS OF DIGESTIVE TRACT: Chronic | ICD-10-CM

## 2020-09-02 DIAGNOSIS — Z90.710 ACQUIRED ABSENCE OF BOTH CERVIX AND UTERUS: Chronic | ICD-10-CM

## 2020-09-02 PROCEDURE — 70492 CT SFT TSUE NCK W/O & W/DYE: CPT

## 2020-09-02 PROCEDURE — 70491 CT SOFT TISSUE NECK W/DYE: CPT | Mod: 26

## 2020-11-03 ENCOUNTER — APPOINTMENT (OUTPATIENT)
Dept: SURGERY | Facility: CLINIC | Age: 50
End: 2020-11-03

## 2020-11-09 NOTE — PRE-OP CHECKLIST - LAST TOOK
clears Composite Graft Text: The defect edges were debeveled with a #15 scalpel blade.  Given the location of the defect, shape of the defect, the proximity to free margins and the fact the defect was full thickness a composite graft was deemed most appropriate.  The defect was outline and then transferred to the donor site.  A full thickness graft was then excised from the donor site. The graft was then placed in the primary defect, oriented appropriately and then sutured into place.  The secondary defect was then repaired using a primary closure.

## 2021-01-27 ENCOUNTER — OUTPATIENT (OUTPATIENT)
Dept: OUTPATIENT SERVICES | Facility: HOSPITAL | Age: 51
LOS: 1 days | End: 2021-01-27

## 2021-01-27 VITALS
OXYGEN SATURATION: 98 % | TEMPERATURE: 97 F | DIASTOLIC BLOOD PRESSURE: 80 MMHG | WEIGHT: 153 LBS | SYSTOLIC BLOOD PRESSURE: 106 MMHG | HEART RATE: 98 BPM | HEIGHT: 61.5 IN | RESPIRATION RATE: 18 BRPM

## 2021-01-27 DIAGNOSIS — Z90.49 ACQUIRED ABSENCE OF OTHER SPECIFIED PARTS OF DIGESTIVE TRACT: Chronic | ICD-10-CM

## 2021-01-27 DIAGNOSIS — Z98.82 BREAST IMPLANT STATUS: Chronic | ICD-10-CM

## 2021-01-27 DIAGNOSIS — Z90.710 ACQUIRED ABSENCE OF BOTH CERVIX AND UTERUS: Chronic | ICD-10-CM

## 2021-01-27 DIAGNOSIS — Z98.890 OTHER SPECIFIED POSTPROCEDURAL STATES: Chronic | ICD-10-CM

## 2021-01-27 DIAGNOSIS — Z01.818 ENCOUNTER FOR OTHER PREPROCEDURAL EXAMINATION: ICD-10-CM

## 2021-01-27 DIAGNOSIS — E21.0 PRIMARY HYPERPARATHYROIDISM: ICD-10-CM

## 2021-01-27 DIAGNOSIS — Z87.448 PERSONAL HISTORY OF OTHER DISEASES OF URINARY SYSTEM: Chronic | ICD-10-CM

## 2021-01-27 RX ORDER — LOSARTAN POTASSIUM 100 MG/1
1 TABLET, FILM COATED ORAL
Qty: 0 | Refills: 0 | DISCHARGE

## 2021-01-27 RX ORDER — FUROSEMIDE 40 MG
1 TABLET ORAL
Qty: 0 | Refills: 0 | DISCHARGE

## 2021-01-27 RX ORDER — ALENDRONATE SODIUM 70 MG/1
1 TABLET ORAL
Qty: 0 | Refills: 0 | DISCHARGE

## 2021-01-27 RX ORDER — MIRABEGRON 50 MG/1
1 TABLET, EXTENDED RELEASE ORAL
Qty: 0 | Refills: 0 | DISCHARGE

## 2021-01-27 NOTE — H&P PST ADULT - HISTORY OF PRESENT ILLNESS
50 yr old with PMHx of HTN, Pancreatitis, Hyperparathyroidism? ( Diagnosed in 2017, was prescribed Alendronate and Furosemide but refused medications and Surgery), s/p Cholecystectomy, Internal hemorrhoids, Breast implants, Abdominoplasty, chronic constipation, overactive bladder, s/p cystocele surgery x 2 is here today for presurgical testing. Patient c/o inability to hold and leaking urine and is now she scheduled for anterior and posterior colporrhaphy with enterocele and cystocele repair on 11/13/19.     50 yr old female with PMHx of HTN, presents to [facility] for pre op evaluation with long term h/o elevated Calcium levels. Pre op diagnosis: primary hyperparathyroidism. Now scheduled for parathyroidectomy with Parathyroid Hormone Assay on 02/05/2021

## 2021-01-27 NOTE — H&P PST ADULT - NSICDXPASTSURGICALHX_GEN_ALL_CORE_FT
PAST SURGICAL HISTORY:  H/O abdominoplasty     H/O breast implant     H/O: hysterectomy 2009    History of biliary stent insertion     History of cystocele cystocele repair x 2    S/P cholecystectomy PAST SURGICAL HISTORY:  H/O abdominoplasty     H/O breast implant     H/O: hysterectomy 2009    History of biliary stent insertion     History of cystocele cystocele repair x 2    S/P anterior colporrhaphy and posterior with enterocele and cystocele repair ; 11/2019    S/P bilateral breast implants revision;  12/16/2020    S/P cholecystectomy

## 2021-01-27 NOTE — H&P PST ADULT - NSICDXPASTMEDICALHX_GEN_ALL_CORE_FT
PAST MEDICAL HISTORY:  Female cystocele     H/O cholecystitis     H/O hypercalcemia     Hepatitis     History of hyperparathyroidism     Pancreatitis     Stress incontinence, female     Vaginal enterocele PAST MEDICAL HISTORY:  Female cystocele     H/O cholecystitis     H/O hypercalcemia     Hepatitis     History of hyperparathyroidism     Pancreatitis     Stress incontinence, female     Vaginal enterocele

## 2021-01-27 NOTE — H&P PST ADULT - NSICDXPROBLEM_GEN_ALL_CORE_FT
PROBLEM DIAGNOSES  Problem: Primary hyperparathyroidism  Assessment and Plan: Scheduled for parathyroidectomy with Parathyroid Hormone Assay on 02/05/2021. Pre op instructions, famotidine, chlorhexidine gluconate soap given and explained. Pt verbalized understanding.  Pt instructed to contact surgeon's office regarding Covid test pre op. Pt verbalized understanding.

## 2021-01-27 NOTE — H&P PST ADULT - NEGATIVE SKIN SYMPTOMS
no rash/no itching/no dryness/no brittle nails/no pitted nails no rash/no itching/no dryness/no brittle nails/no pitted nails/no hair loss

## 2021-01-27 NOTE — H&P PST ADULT - NEGATIVE GENERAL GENITOURINARY SYMPTOMS
no hematuria/no renal colic/no flank pain L/no flank pain R/no urine discoloration/no gas in urine/no dysuria no hematuria/no renal colic/no flank pain L/no flank pain R/no urine discoloration/no gas in urine/no bladder infections/no dysuria

## 2021-01-27 NOTE — H&P PST ADULT - NEGATIVE CARDIOVASCULAR SYMPTOMS
no chest pain/no dyspnea on exertion/no orthopnea/no paroxysmal nocturnal dyspnea/no peripheral edema/no claudication no chest pain/no palpitations/no dyspnea on exertion/no orthopnea/no paroxysmal nocturnal dyspnea/no peripheral edema/no claudication

## 2021-02-07 ENCOUNTER — TRANSCRIPTION ENCOUNTER (OUTPATIENT)
Age: 51
End: 2021-02-07

## 2021-02-08 ENCOUNTER — RESULT REVIEW (OUTPATIENT)
Age: 51
End: 2021-02-08

## 2021-02-08 ENCOUNTER — OUTPATIENT (OUTPATIENT)
Dept: OUTPATIENT SERVICES | Facility: HOSPITAL | Age: 51
LOS: 1 days | Discharge: HOME | End: 2021-02-08
Payer: MEDICAID

## 2021-02-08 ENCOUNTER — APPOINTMENT (OUTPATIENT)
Dept: SURGERY | Facility: HOSPITAL | Age: 51
End: 2021-02-08

## 2021-02-08 VITALS
SYSTOLIC BLOOD PRESSURE: 115 MMHG | HEIGHT: 61 IN | RESPIRATION RATE: 16 BRPM | HEART RATE: 88 BPM | WEIGHT: 153 LBS | DIASTOLIC BLOOD PRESSURE: 82 MMHG | TEMPERATURE: 98 F | OXYGEN SATURATION: 100 %

## 2021-02-08 VITALS
RESPIRATION RATE: 15 BRPM | SYSTOLIC BLOOD PRESSURE: 117 MMHG | DIASTOLIC BLOOD PRESSURE: 80 MMHG | OXYGEN SATURATION: 99 % | HEART RATE: 100 BPM

## 2021-02-08 DIAGNOSIS — Z90.710 ACQUIRED ABSENCE OF BOTH CERVIX AND UTERUS: Chronic | ICD-10-CM

## 2021-02-08 DIAGNOSIS — Z90.49 ACQUIRED ABSENCE OF OTHER SPECIFIED PARTS OF DIGESTIVE TRACT: Chronic | ICD-10-CM

## 2021-02-08 DIAGNOSIS — Z98.82 BREAST IMPLANT STATUS: Chronic | ICD-10-CM

## 2021-02-08 DIAGNOSIS — Z87.448 PERSONAL HISTORY OF OTHER DISEASES OF URINARY SYSTEM: Chronic | ICD-10-CM

## 2021-02-08 DIAGNOSIS — E21.0 PRIMARY HYPERPARATHYROIDISM: ICD-10-CM

## 2021-02-08 DIAGNOSIS — Z98.890 OTHER SPECIFIED POSTPROCEDURAL STATES: Chronic | ICD-10-CM

## 2021-02-08 LAB
PTH INTACT, INTRAOP SPECIMEN 2: SIGNIFICANT CHANGE UP
PTH INTACT, INTRAOP SPECIMEN 3: SIGNIFICANT CHANGE UP
PTH INTACT, INTRAOP SPECIMEN 4: SIGNIFICANT CHANGE UP
PTH INTACT, INTRAOP TIMING 2: SIGNIFICANT CHANGE UP
PTH INTACT, INTRAOP TIMING 3: SIGNIFICANT CHANGE UP
PTH INTACT, INTRAOP TIMING 4: SIGNIFICANT CHANGE UP
PTH INTACT, INTRAOPERATIVE 2: 106 PG/ML — HIGH (ref 15–65)
PTH INTACT, INTRAOPERATIVE 3: 39 PG/ML — SIGNIFICANT CHANGE UP (ref 15–65)
PTH INTACT, INTRAOPERATIVE 4: 35 PG/ML — SIGNIFICANT CHANGE UP (ref 15–65)
PTH-INTACT IO % DIF SERPL: 287 PG/ML — HIGH (ref 15–65)

## 2021-02-08 PROCEDURE — 60500 EXPLORE PARATHYROID GLANDS: CPT

## 2021-02-08 PROCEDURE — 88305 TISSUE EXAM BY PATHOLOGIST: CPT | Mod: 26

## 2021-02-08 PROCEDURE — 88333 PATH CONSLTJ SURG CYTO XM 1: CPT | Mod: 26

## 2021-02-08 RX ORDER — CALCIUM CARBONATE 500(1250)
1 TABLET ORAL EVERY 6 HOURS
Refills: 0 | Status: DISCONTINUED | OUTPATIENT
Start: 2021-02-08 | End: 2021-02-22

## 2021-02-08 RX ORDER — ACETAMINOPHEN 500 MG
2 TABLET ORAL
Qty: 0 | Refills: 0 | DISCHARGE
Start: 2021-02-08

## 2021-02-08 RX ORDER — BENZOCAINE AND MENTHOL 5; 1 G/100ML; G/100ML
1 LIQUID ORAL
Refills: 0 | Status: DISCONTINUED | OUTPATIENT
Start: 2021-02-08 | End: 2021-02-22

## 2021-02-08 RX ORDER — CALCIUM CARBONATE 500(1250)
1 TABLET ORAL
Qty: 0 | Refills: 0 | DISCHARGE
Start: 2021-02-08

## 2021-02-08 RX ORDER — SODIUM CHLORIDE 9 MG/ML
1000 INJECTION, SOLUTION INTRAVENOUS
Refills: 0 | Status: DISCONTINUED | OUTPATIENT
Start: 2021-02-08 | End: 2021-02-22

## 2021-02-08 RX ORDER — ACETAMINOPHEN 500 MG
650 TABLET ORAL EVERY 6 HOURS
Refills: 0 | Status: DISCONTINUED | OUTPATIENT
Start: 2021-02-08 | End: 2021-02-22

## 2021-02-08 RX ORDER — OXYCODONE AND ACETAMINOPHEN 5; 325 MG/1; MG/1
1 TABLET ORAL EVERY 6 HOURS
Refills: 0 | Status: DISCONTINUED | OUTPATIENT
Start: 2021-02-08 | End: 2021-02-08

## 2021-02-08 RX ADMIN — BENZOCAINE AND MENTHOL 1 LOZENGE: 5; 1 LIQUID ORAL at 17:15

## 2021-02-08 RX ADMIN — Medication 1 TABLET(S): at 17:14

## 2021-02-08 NOTE — ASU DISCHARGE PLAN (ADULT/PEDIATRIC) - NURSING INSTRUCTIONS
You received IV Tylenol for pain management at 3pm. Please DO NOT take any Tylenol (Acetaminophen) containing products, such as Vicodin, Percocet, Excedrin, and cold medications for the next 6 hours (until 9 PM). DO NOT TAKE MORE THAN 3000 MG OF TYLENOL in a 24 hour period.

## 2021-02-08 NOTE — ASU DISCHARGE PLAN (ADULT/PEDIATRIC) - CARE PROVIDER_API CALL
Tommy Regalado)  Plastic Surgery  [address] [place] [address] [address]  [place] [address]  Phone: [phone]  Fax: [phone]  Follow Up Time:

## 2021-02-08 NOTE — BRIEF OPERATIVE NOTE - NSICDXBRIEFPREOP_GEN_ALL_CORE_FT
PRE-OP DIAGNOSIS:  Hyperparathyroidism 08-Feb-2021 16:00:37  Robert Bolanos  
moderate assist (50% patients effort)/maximum assist (25% patients effort)

## 2021-02-12 LAB — SURGICAL PATHOLOGY STUDY: SIGNIFICANT CHANGE UP

## 2021-02-18 ENCOUNTER — NON-APPOINTMENT (OUTPATIENT)
Age: 51
End: 2021-02-18

## 2021-02-18 ENCOUNTER — APPOINTMENT (OUTPATIENT)
Dept: SURGERY | Facility: CLINIC | Age: 51
End: 2021-02-18
Payer: MEDICAID

## 2021-02-18 LAB
25(OH)D3 SERPL-MCNC: 18.7 NG/ML
CALCIUM SERPL-MCNC: 9.3 MG/DL
CALCIUM SERPL-MCNC: 9.3 MG/DL
PARATHYROID HORMONE INTACT: 37 PG/ML

## 2021-02-18 PROCEDURE — 99024 POSTOP FOLLOW-UP VISIT: CPT

## 2021-02-18 PROCEDURE — 36415 COLL VENOUS BLD VENIPUNCTURE: CPT

## 2021-02-18 NOTE — PHYSICAL EXAM
[de-identified] : well healed scar [Midline] : located in midline position [Normal] : orientation to person, place, and time: normal [de-identified] : Neg Chvostek's sign

## 2021-02-18 NOTE — ASSESSMENT
[FreeTextEntry1] : s/p parathyroidectomy\par daily care\par path reviewed\par  blood work\par f/u 3 months

## 2021-02-19 ENCOUNTER — NON-APPOINTMENT (OUTPATIENT)
Age: 51
End: 2021-02-19

## 2021-05-04 NOTE — H&P PST ADULT - NSANTHAGERD_ENT_A_CORE
Nasalis-Muscle-Based Myocutaneous Island Pedicle Flap Text: Using a #15 blade, an incision was made around the donor flap to the level of the nasalis muscle. Wide lateral undermining was then performed in both the subcutaneous plane above the nasalis muscle, and in a submuscular plane just above periosteum. This allowed the formation of a free nasalis muscle axial pedicle (based on the angular artery) which was still attached to the actual cutaneous flap, increasing its mobility and vascular viability. Hemostasis was obtained with pinpoint electrocoagulation. The flap was mobilized into position and the pivotal anchor points positioned and stabilized with buried interrupted sutures. Subcutaneous and dermal tissues were closed in a multilayered fashion with sutures. Tissue redundancies were excised, and the epidermal edges were apposed without significant tension and sutured with sutures. No

## 2021-05-18 ENCOUNTER — APPOINTMENT (OUTPATIENT)
Dept: SURGERY | Facility: CLINIC | Age: 51
End: 2021-05-18
Payer: MEDICAID

## 2021-05-18 DIAGNOSIS — E21.0 PRIMARY HYPERPARATHYROIDISM: ICD-10-CM

## 2021-05-18 PROCEDURE — 99072 ADDL SUPL MATRL&STAF TM PHE: CPT

## 2021-05-18 PROCEDURE — 99214 OFFICE O/P EST MOD 30 MIN: CPT | Mod: 25

## 2021-05-18 PROCEDURE — 36415 COLL VENOUS BLD VENIPUNCTURE: CPT

## 2021-05-18 RX ORDER — ALENDRONATE SODIUM 70 MG/1
70 TABLET ORAL
Qty: 4 | Refills: 0 | Status: ACTIVE | COMMUNITY
Start: 2021-02-23

## 2021-05-18 RX ORDER — LOSARTAN POTASSIUM 100 MG/1
100 TABLET, FILM COATED ORAL
Qty: 30 | Refills: 0 | Status: ACTIVE | COMMUNITY
Start: 2021-05-07

## 2021-05-18 NOTE — PHYSICAL EXAM
[de-identified] : well healed scar [de-identified] : no palpable thyroid nodules [Laryngoscopy Performed] : laryngoscopy was performed, see procedure section for findings [Midline] : located in midline position [Normal] : orientation to person, place, and time: normal

## 2021-05-18 NOTE — HISTORY OF PRESENT ILLNESS
[de-identified] : 3 months s/p parathyroidectomy.  denies dysphagia, hoarseness or perioral or hand tingling. no changes medically since last visit. has not been taking vitamin D. I have reviewed all old and new data and available images.

## 2021-05-19 LAB
24R-OH-CALCIDIOL SERPL-MCNC: 84.2 PG/ML
CALCIUM SERPL-MCNC: 9.6 MG/DL
CALCIUM SERPL-MCNC: 9.6 MG/DL
PARATHYROID HORMONE INTACT: 37 PG/ML

## 2021-05-20 ENCOUNTER — NON-APPOINTMENT (OUTPATIENT)
Age: 51
End: 2021-05-20

## 2022-06-25 NOTE — PATIENT PROFILE ADULT - HOW PATIENT ADDRESSED, PROFILE
Midnight labs reviewed with Dr Luzma Kay. Per Dr Filiberto Moctezuma, give 20u of Lantus and bridge to SQ humalog.   Donte Lopez RN No preference

## 2023-07-25 NOTE — H&P PST ADULT - NSSUBSTANCEUSE_GEN_ALL_CORE_SD
Anesthesia Post-op Note    Patient: Trell Puente  Procedure(s) Performed: INCISION AND DRAINAGE, WOUND VAC LEFT TIBIA; LEFT THIGH DEBRIDEMENT, COMPLEX CLOSURE, POSSIBLE WOUND VAC CHANGE LEFT LEG  Anesthesia type: General    Vitals Value Taken Time   Temp 36.4 07/24/23 2004   Pulse 115 07/24/23 2003   Resp 18 07/24/23 2003   SpO2 100 % 07/24/23 2002   /73 07/24/23 2000   Vitals shown include unvalidated device data.      Patient Location: PACU Phase 1  Post-op Vital Signs:stable  Level of Consciousness: participates in exam, awake, alert and follows commands  Respiratory Status: spontaneous ventilation, unassisted and nasal cannula  Cardiovascular blood pressure returned to baseline and stable  Hydration: euvolemic  Pain Management: adequately managed  Handoff: Handoff to receiving nurse was performed and questions were answered  Nausea: None  Airway Patency:patent  Post-op Assessment: awake, alert, appropriately conversant, or baseline, no complications, patient tolerated procedure well, dentition within defined limits and moving all extremities  Comments: Ventilating and oxygenating well, protects airway. Dentition within defined limits.  No aspiration, ctab, no wheezing or coughing.   No hoarseness.       No notable events documented.  
caffeine
